# Patient Record
Sex: FEMALE | Race: ASIAN | NOT HISPANIC OR LATINO
[De-identification: names, ages, dates, MRNs, and addresses within clinical notes are randomized per-mention and may not be internally consistent; named-entity substitution may affect disease eponyms.]

---

## 2019-01-01 PROBLEM — Z00.00 ENCOUNTER FOR PREVENTIVE HEALTH EXAMINATION: Status: ACTIVE | Noted: 2019-01-01

## 2019-01-02 PROBLEM — E66.01 MORBID OBESITY: Status: ACTIVE | Noted: 2019-01-02

## 2019-01-09 ENCOUNTER — APPOINTMENT (OUTPATIENT)
Dept: SURGERY | Facility: CLINIC | Age: 29
End: 2019-01-09
Payer: COMMERCIAL

## 2019-01-09 VITALS
WEIGHT: 220 LBS | DIASTOLIC BLOOD PRESSURE: 69 MMHG | BODY MASS INDEX: 40.48 KG/M2 | SYSTOLIC BLOOD PRESSURE: 100 MMHG | HEIGHT: 62 IN | TEMPERATURE: 98.3 F | HEART RATE: 57 BPM

## 2019-01-09 DIAGNOSIS — E66.01 MORBID (SEVERE) OBESITY DUE TO EXCESS CALORIES: ICD-10-CM

## 2019-01-09 DIAGNOSIS — Z83.3 FAMILY HISTORY OF DIABETES MELLITUS: ICD-10-CM

## 2019-01-09 DIAGNOSIS — E28.2 POLYCYSTIC OVARIAN SYNDROME: ICD-10-CM

## 2019-01-09 DIAGNOSIS — Z78.9 OTHER SPECIFIED HEALTH STATUS: ICD-10-CM

## 2019-01-09 DIAGNOSIS — Z87.39 PERSONAL HISTORY OF OTHER DISEASES OF THE MUSCULOSKELETAL SYSTEM AND CONNECTIVE TISSUE: ICD-10-CM

## 2019-01-09 DIAGNOSIS — G43.909 MIGRAINE, UNSPECIFIED, NOT INTRACTABLE, W/OUT STATUS MIGRAINOSUS: ICD-10-CM

## 2019-01-09 DIAGNOSIS — Z82.49 FAMILY HISTORY OF ISCHEMIC HEART DISEASE AND OTHER DISEASES OF THE CIRCULATORY SYSTEM: ICD-10-CM

## 2019-01-09 PROCEDURE — 99203 OFFICE O/P NEW LOW 30 MIN: CPT

## 2019-01-23 ENCOUNTER — APPOINTMENT (OUTPATIENT)
Dept: ULTRASOUND IMAGING | Facility: HOSPITAL | Age: 29
End: 2019-01-23

## 2019-02-20 PROBLEM — Z87.39 HISTORY OF BACK PAIN: Status: RESOLVED | Noted: 2019-01-09 | Resolved: 2019-02-20

## 2019-02-20 PROBLEM — G43.909 MIGRAINE: Status: RESOLVED | Noted: 2019-01-09 | Resolved: 2019-02-20

## 2019-02-20 PROBLEM — Z83.3 FAMILY HISTORY OF DIABETES MELLITUS: Status: ACTIVE | Noted: 2019-02-20

## 2019-02-20 PROBLEM — Z82.49 FAMILY HISTORY OF ESSENTIAL HYPERTENSION: Status: ACTIVE | Noted: 2019-02-20

## 2019-02-20 PROBLEM — Z78.9 SOCIAL ALCOHOL USE: Status: ACTIVE | Noted: 2019-01-09

## 2019-02-20 PROBLEM — E28.2 PCOS (POLYCYSTIC OVARIAN SYNDROME): Status: ACTIVE | Noted: 2019-01-09

## 2019-02-20 RX ORDER — DULOXETINE HYDROCHLORIDE 20 MG/1
20 CAPSULE, DELAYED RELEASE PELLETS ORAL
Refills: 0 | Status: ACTIVE | COMMUNITY

## 2019-09-28 ENCOUNTER — EMERGENCY (EMERGENCY)
Facility: HOSPITAL | Age: 29
LOS: 1 days | Discharge: HOME | End: 2019-09-28
Admitting: EMERGENCY MEDICINE
Payer: COMMERCIAL

## 2019-09-28 VITALS
SYSTOLIC BLOOD PRESSURE: 122 MMHG | RESPIRATION RATE: 18 BRPM | TEMPERATURE: 99 F | OXYGEN SATURATION: 100 % | WEIGHT: 210.1 LBS | DIASTOLIC BLOOD PRESSURE: 75 MMHG | HEIGHT: 62 IN | HEART RATE: 100 BPM

## 2019-09-28 PROCEDURE — 99283 EMERGENCY DEPT VISIT LOW MDM: CPT

## 2019-09-28 RX ORDER — KETOROLAC TROMETHAMINE 30 MG/ML
30 SYRINGE (ML) INJECTION ONCE
Refills: 0 | Status: DISCONTINUED | OUTPATIENT
Start: 2019-09-28 | End: 2019-09-28

## 2019-09-28 RX ORDER — DEXAMETHASONE 0.5 MG/5ML
10 ELIXIR ORAL ONCE
Refills: 0 | Status: COMPLETED | OUTPATIENT
Start: 2019-09-28 | End: 2019-09-28

## 2019-09-28 RX ORDER — DEXAMETHASONE 0.5 MG/5ML
10 ELIXIR ORAL ONCE
Refills: 0 | Status: DISCONTINUED | OUTPATIENT
Start: 2019-09-28 | End: 2019-09-28

## 2019-09-28 RX ORDER — DEXAMETHASONE 0.5 MG/5ML
6 ELIXIR ORAL ONCE
Refills: 0 | Status: DISCONTINUED | OUTPATIENT
Start: 2019-09-28 | End: 2019-09-28

## 2019-09-28 RX ADMIN — Medication 10 MILLIGRAM(S): at 10:55

## 2019-09-28 RX ADMIN — Medication 1 TABLET(S): at 10:49

## 2019-09-28 RX ADMIN — Medication 30 MILLIGRAM(S): at 10:49

## 2019-09-28 NOTE — ED ADULT NURSE NOTE - OBJECTIVE STATEMENT
A&Ox4, respirations even and unlabored, speaks in clear and full sentences, no drooling observed, ambulatory. Patient complains of sore throat, pain when swallowing. Denies chest pain, SOB, LOC.

## 2019-09-28 NOTE — ED PROVIDER NOTE - OBJECTIVE STATEMENT
29 y/o female no pmh c/o sore throat x2 days. Pt admits to pain with swallowing liquids and solids. Admits to mild nausea and chills. Denies taking any OTC meds. Denies chest pain, sob, difficulty swallowing, v/d, dizziness, syncope, or fever.

## 2019-09-28 NOTE — ED PROVIDER NOTE - THROAT FINDINGS
OROPHARYNGEAL EXUDATE/THROAT RED/NO VESICLES/ULCERS/uvula midline/NO TONGUE ELEVATION/NO DROOLING/TONSILLAR SWELLING/NO STRIDOR

## 2019-09-28 NOTE — ED ADULT NURSE NOTE - NSIMPLEMENTINTERV_GEN_ALL_ED
Implemented All Universal Safety Interventions:  [place] to call system. Call bell, personal items and telephone within reach. Instruct patient to call for assistance. Room bathroom lighting operational. Non-slip footwear when patient is off stretcher. Physically safe environment: no spills, clutter or unnecessary equipment. Stretcher in lowest position, wheels locked, appropriate side rails in place.

## 2019-09-28 NOTE — ED PROVIDER NOTE - CLINICAL SUMMARY MEDICAL DECISION MAKING FREE TEXT BOX
29 y/o female no pmh c/o sore throat x2 days- + exudate, + tender anterior adenopathy, no cough. likely strep, will treat with augmentin, toradol and decadron. breonna

## 2019-09-28 NOTE — ED PROVIDER NOTE - PATIENT PORTAL LINK FT
You can access the FollowMyHealth Patient Portal offered by [facility] by registering at the following website: [email or web address]. By joining [employer]’s FollowMyHealth portal, you will also be able to view your health information using other applications (apps) compatible with our system.

## 2020-02-05 ENCOUNTER — RESULT REVIEW (OUTPATIENT)
Age: 30
End: 2020-02-05

## 2020-07-31 ENCOUNTER — EMERGENCY (EMERGENCY)
Facility: HOSPITAL | Age: 30
LOS: 1 days | Discharge: HOME | End: 2020-07-31
Attending: EMERGENCY MEDICINE | Admitting: EMERGENCY MEDICINE
Payer: COMMERCIAL

## 2020-07-31 VITALS
HEART RATE: 78 BPM | DIASTOLIC BLOOD PRESSURE: 48 MMHG | OXYGEN SATURATION: 100 % | SYSTOLIC BLOOD PRESSURE: 112 MMHG | TEMPERATURE: 98 F | RESPIRATION RATE: 17 BRPM

## 2020-07-31 VITALS
OXYGEN SATURATION: 99 % | SYSTOLIC BLOOD PRESSURE: 120 MMHG | HEART RATE: 78 BPM | DIASTOLIC BLOOD PRESSURE: 48 MMHG | RESPIRATION RATE: 15 BRPM | TEMPERATURE: 99 F

## 2020-07-31 PROBLEM — Z78.9 OTHER SPECIFIED HEALTH STATUS: Chronic | Status: ACTIVE | Noted: 2019-09-28

## 2020-07-31 LAB
ALBUMIN SERPL ELPH-MCNC: 4.1 G/DL — SIGNIFICANT CHANGE UP (ref 3.3–5)
ALP SERPL-CCNC: 97 U/L — SIGNIFICANT CHANGE UP (ref 40–120)
ALT FLD-CCNC: 28 U/L — SIGNIFICANT CHANGE UP (ref 4–33)
ANION GAP SERPL CALC-SCNC: 10 MMO/L — SIGNIFICANT CHANGE UP (ref 7–14)
AST SERPL-CCNC: 33 U/L — HIGH (ref 4–32)
BASOPHILS # BLD AUTO: 0.06 K/UL — SIGNIFICANT CHANGE UP (ref 0–0.2)
BASOPHILS NFR BLD AUTO: 0.5 % — SIGNIFICANT CHANGE UP (ref 0–2)
BILIRUB SERPL-MCNC: 0.3 MG/DL — SIGNIFICANT CHANGE UP (ref 0.2–1.2)
BUN SERPL-MCNC: 11 MG/DL — SIGNIFICANT CHANGE UP (ref 7–23)
CALCIUM SERPL-MCNC: 9 MG/DL — SIGNIFICANT CHANGE UP (ref 8.4–10.5)
CHLORIDE SERPL-SCNC: 102 MMOL/L — SIGNIFICANT CHANGE UP (ref 98–107)
CO2 SERPL-SCNC: 24 MMOL/L — SIGNIFICANT CHANGE UP (ref 22–31)
CREAT SERPL-MCNC: 0.63 MG/DL — SIGNIFICANT CHANGE UP (ref 0.5–1.3)
EOSINOPHIL # BLD AUTO: 0.44 K/UL — SIGNIFICANT CHANGE UP (ref 0–0.5)
EOSINOPHIL NFR BLD AUTO: 3.7 % — SIGNIFICANT CHANGE UP (ref 0–6)
GLUCOSE SERPL-MCNC: 123 MG/DL — HIGH (ref 70–99)
HCT VFR BLD CALC: 47.2 % — HIGH (ref 34.5–45)
HGB BLD-MCNC: 15.2 G/DL — SIGNIFICANT CHANGE UP (ref 11.5–15.5)
IMM GRANULOCYTES NFR BLD AUTO: 0.4 % — SIGNIFICANT CHANGE UP (ref 0–1.5)
LYMPHOCYTES # BLD AUTO: 27.4 % — SIGNIFICANT CHANGE UP (ref 13–44)
LYMPHOCYTES # BLD AUTO: 3.28 K/UL — SIGNIFICANT CHANGE UP (ref 1–3.3)
MCHC RBC-ENTMCNC: 25.9 PG — LOW (ref 27–34)
MCHC RBC-ENTMCNC: 32.2 % — SIGNIFICANT CHANGE UP (ref 32–36)
MCV RBC AUTO: 80.5 FL — SIGNIFICANT CHANGE UP (ref 80–100)
MONOCYTES # BLD AUTO: 0.79 K/UL — SIGNIFICANT CHANGE UP (ref 0–0.9)
MONOCYTES NFR BLD AUTO: 6.6 % — SIGNIFICANT CHANGE UP (ref 2–14)
NEUTROPHILS # BLD AUTO: 7.36 K/UL — SIGNIFICANT CHANGE UP (ref 1.8–7.4)
NEUTROPHILS NFR BLD AUTO: 61.4 % — SIGNIFICANT CHANGE UP (ref 43–77)
NRBC # FLD: 0 K/UL — SIGNIFICANT CHANGE UP (ref 0–0)
PLATELET # BLD AUTO: 314 K/UL — SIGNIFICANT CHANGE UP (ref 150–400)
PMV BLD: 9.7 FL — SIGNIFICANT CHANGE UP (ref 7–13)
POTASSIUM SERPL-MCNC: 4 MMOL/L — SIGNIFICANT CHANGE UP (ref 3.5–5.3)
POTASSIUM SERPL-SCNC: 4 MMOL/L — SIGNIFICANT CHANGE UP (ref 3.5–5.3)
PROT SERPL-MCNC: 7.7 G/DL — SIGNIFICANT CHANGE UP (ref 6–8.3)
RBC # BLD: 5.86 M/UL — HIGH (ref 3.8–5.2)
RBC # FLD: 13.5 % — SIGNIFICANT CHANGE UP (ref 10.3–14.5)
SODIUM SERPL-SCNC: 136 MMOL/L — SIGNIFICANT CHANGE UP (ref 135–145)
WBC # BLD: 11.98 K/UL — HIGH (ref 3.8–10.5)
WBC # FLD AUTO: 11.98 K/UL — HIGH (ref 3.8–10.5)

## 2020-07-31 PROCEDURE — 99284 EMERGENCY DEPT VISIT MOD MDM: CPT

## 2020-07-31 RX ORDER — KETOROLAC TROMETHAMINE 30 MG/ML
15 SYRINGE (ML) INJECTION ONCE
Refills: 0 | Status: DISCONTINUED | OUTPATIENT
Start: 2020-07-31 | End: 2020-07-31

## 2020-07-31 RX ORDER — METOCLOPRAMIDE HCL 10 MG
5 TABLET ORAL ONCE
Refills: 0 | Status: DISCONTINUED | OUTPATIENT
Start: 2020-07-31 | End: 2020-07-31

## 2020-07-31 RX ORDER — KETOROLAC TROMETHAMINE 30 MG/ML
30 SYRINGE (ML) INJECTION ONCE
Refills: 0 | Status: DISCONTINUED | OUTPATIENT
Start: 2020-07-31 | End: 2020-07-31

## 2020-07-31 RX ORDER — SODIUM CHLORIDE 9 MG/ML
1000 INJECTION INTRAMUSCULAR; INTRAVENOUS; SUBCUTANEOUS ONCE
Refills: 0 | Status: DISCONTINUED | OUTPATIENT
Start: 2020-07-31 | End: 2020-07-31

## 2020-07-31 RX ORDER — METOCLOPRAMIDE HCL 10 MG
10 TABLET ORAL ONCE
Refills: 0 | Status: COMPLETED | OUTPATIENT
Start: 2020-07-31 | End: 2020-07-31

## 2020-07-31 RX ORDER — SODIUM CHLORIDE 9 MG/ML
1000 INJECTION INTRAMUSCULAR; INTRAVENOUS; SUBCUTANEOUS ONCE
Refills: 0 | Status: COMPLETED | OUTPATIENT
Start: 2020-07-31 | End: 2020-07-31

## 2020-07-31 RX ADMIN — Medication 30 MILLIGRAM(S): at 09:59

## 2020-07-31 RX ADMIN — SODIUM CHLORIDE 1000 MILLILITER(S): 9 INJECTION INTRAMUSCULAR; INTRAVENOUS; SUBCUTANEOUS at 10:00

## 2020-07-31 RX ADMIN — Medication 10 MILLIGRAM(S): at 09:59

## 2020-07-31 NOTE — ED PROVIDER NOTE - ATTENDING CONTRIBUTION TO CARE
Dr. Webster:  I have personally performed a face to face bedside history and physical examination of this patient. I have discussed the history, examination, review of systems, assessment and plan of management with the resident. I have reviewed the electronic medical record and amended it to reflect my history, review of systems, physical exam, assessment and plan.    29F h/o migraines presents with gradual onset right sided headache that started last night.  +Nausea.  Took tylenol prior to ED arrival with little relief.  Denies fever/chills, neck stiffness, cp, vomiting, sob, focal weakness.    Exam:  - nad  - rrr  - ctab  - abd soft ntnd  - no focal neuro deficits    A/P  - R sided headache, likely migraine, no red flags on history/physical  - basic labs, meds, reassess

## 2020-07-31 NOTE — ED PROVIDER NOTE - NS ED ROS FT
Constitutional: No fever, chills.  Eyes:  No visual changes  ENMT:  +headache   Cardiac:  No chest pain  Respiratory:  No cough, SOB  GI:  No nausea, vomiting, diarrhea, abdominal pain.  :  No dysuria, hematuria  MS:  No back pain.  Neuro:  No headache or lightheadedness  Skin:  No skin rash  Endocrine: No history of thyroid disease or diabetes.  Except as documented in the HPI,  all other systems are negative.

## 2020-07-31 NOTE — ED ADULT NURSE NOTE - OBJECTIVE STATEMENT
pt presents wdith c/o HA , nausea, and slight dizziness that started last night, states a history of migraines and that she took tylenol last night that did no help

## 2020-07-31 NOTE — ED PROVIDER NOTE - NSFOLLOWUPINSTRUCTIONS_ED_ALL_ED_FT
You were seen in the emergency department for headache   We checked your blood, electrolyte and all your results came back normal.     - Please continue taking all of your home medications as directed.    - Please follow up with your PMD in the next 24-48hrs.  - Please return to the ED if you have any new or concerning symptoms.  - If you have any severe increase in fever, pain, uncontrollable nausea, vomiting, or inability to tolerate eating and drinking you need to come right back to the emergency room.     - Follow up with neurology as directed for further management.

## 2020-07-31 NOTE — ED PROVIDER NOTE - PHYSICAL EXAMINATION
Vital signs reviewed  GENERAL: Patient nontoxic appearing, NAD  HEAD: NCAT  EYES: Anicteric  ENT: MMM  NECK: Supple, non tender  RESPIRATORY: Normal respiratory effort. CTA B/L. No wheezing, rales, rhonchi  CARDIOVASCULAR: Regular rate and rhythm  ABDOMEN: Soft. Nondistended. Nontender. No guarding or rebound. No CVA tenderness.  MUSCULOSKELETAL/EXTREMITIES: Brisk cap refill. 2+ radial pulses. No leg edema.  SKIN:  Warm and dry  NEURO: AAOx3. No gross FND. CN 2-12 intact. MS 5/5 B/L. sensation intact. no temple tenderness. full ROM of the neck.   PSYCHIATRIC: Cooperative. Affect appropriate.

## 2020-07-31 NOTE — ED PROVIDER NOTE - NSFOLLOWUPCLINICS_GEN_ALL_ED_FT
[facility]  Neurology  [address] [place] [address] 3rd [place] [address]  Phone: [phone]  Fax:   Follow Up Time:     [facility]  Neurology  [address] [place] [address]  Phone: [phone]  Fax: [phone]  Follow Up Time:

## 2020-07-31 NOTE — ED PROVIDER NOTE - OBJECTIVE STATEMENT
28 y/o F p/w right sided headache and nausea x 12 hrs. pt states the pain gradually worsened to a 7/10 today. pain comes and goes and does not radiate. she has a hx of migraines which are usually localized to the back of the neck. Pt took 500mg of tylenol before arrival to the ED with no help. denies fever, chills, cp, sob, vomiting, diarrhea, numbness/tingling/weakness, blurry vision, worst headache of life.

## 2020-07-31 NOTE — ED ADULT TRIAGE NOTE - CHIEF COMPLAINT QUOTE
alert oriented no distress c/o right side headache with dizziness and blurry vision started last night  took tylenol with no relief  no c/o weakness  has hx migraines ( pain usually in back of head)

## 2020-07-31 NOTE — ED PROVIDER NOTE - CLINICAL SUMMARY MEDICAL DECISION MAKING FREE TEXT BOX
28 y/o F pmhx migraines p/w right sided headache x 12hrs. vitals WNL, physical exam neurologically intact without any gross abnormalities. differentials include migraine vs tension headache vs muscle spasm. will evaluate with pain meds and 1L fluids.

## 2020-07-31 NOTE — ED PROVIDER NOTE - PATIENT PORTAL LINK FT
You can access the FollowMyHealth Patient Portal offered by [facility] by registering at the following website: [email or web address]. By joining [employer]’s FollowMyHealth portal, you will also be able to view your health information using other applications (apps) compatible with our system.

## 2021-06-22 ENCOUNTER — RESULT REVIEW (OUTPATIENT)
Age: 31
End: 2021-06-22

## 2021-11-27 ENCOUNTER — EMERGENCY (EMERGENCY)
Facility: HOSPITAL | Age: 31
LOS: 1 days | Discharge: HOME | End: 2021-11-27
Attending: STUDENT IN AN ORGANIZED HEALTH CARE EDUCATION/TRAINING PROGRAM | Admitting: STUDENT IN AN ORGANIZED HEALTH CARE EDUCATION/TRAINING PROGRAM
Payer: COMMERCIAL

## 2021-11-27 VITALS
TEMPERATURE: 98 F | OXYGEN SATURATION: 100 % | HEART RATE: 69 BPM | SYSTOLIC BLOOD PRESSURE: 133 MMHG | DIASTOLIC BLOOD PRESSURE: 72 MMHG | HEIGHT: 62 IN | RESPIRATION RATE: 15 BRPM

## 2021-11-27 PROCEDURE — 99284 EMERGENCY DEPT VISIT MOD MDM: CPT

## 2021-11-27 NOTE — ED ADULT TRIAGE NOTE - BRAND OF COVID-19 VACCINATION
Strep Throat: Care Instructions  Your Care Instructions    Strep throat is a bacterial infection that causes sudden, severe sore throat and fever. Strep throat, which is caused by bacteria called streptococcus, is treated with antibiotics. Sometimes a strep test is necessary to tell if the sore throat is caused by strep bacteria. Treatment can help ease symptoms and may prevent future problems. Follow-up care is a key part of your treatment and safety. Be sure to make and go to all appointments, and call your doctor if you are having problems. It's also a good idea to know your test results and keep a list of the medicines you take. How can you care for yourself at home? · Take your antibiotics as directed. Do not stop taking them just because you feel better. You need to take the full course of antibiotics. · Strep throat can spread to others until 24 hours after you begin taking antibiotics. During this time, you should avoid contact with other people at work or home, especially infants and children. Do not sneeze or cough on others, and wash your hands often. Keep your drinking glass and eating utensils separate from those of others, and wash these items well in hot, soapy water. · Gargle with warm salt water at least once each hour to help reduce swelling and make your throat feel better. Use 1 teaspoon of salt mixed in 8 fluid ounces of warm water. · Take an over-the-counter pain medication, such as acetaminophen (Tylenol), ibuprofen (Advil, Motrin), or naproxen (Aleve). Read and follow all instructions on the label. · Try an over-the-counter anesthetic throat spray or throat lozenges, which may help relieve throat pain. · Drink plenty of fluids. Fluids may help soothe an irritated throat. Hot fluids, such as tea or soup, may help your throat feel better. · Eat soft solids and drink plenty of clear liquids.  Flavored ice pops, ice cream, scrambled eggs, sherbet, and gelatin dessert (such as Jell-O) may also soothe the throat. · Get lots of rest.  · Do not smoke, and avoid secondhand smoke. If you need help quitting, talk to your doctor about stop-smoking programs and medicines. These can increase your chances of quitting for good. · Use a vaporizer or humidifier to add moisture to the air in your bedroom. Follow the directions for cleaning the machine. When should you call for help? Call your doctor now or seek immediate medical care if:  ? · You have a new or higher fever. ? · You have a fever with a stiff neck or severe headache. ? · You have new or worse trouble swallowing. ? · Your sore throat gets much worse on one side. ? · Your pain becomes much worse on one side of your throat. ? Watch closely for changes in your health, and be sure to contact your doctor if:  ? · You are not getting better after 2 days (48 hours). ? · You do not get better as expected. Where can you learn more? Go to http://enedelia-crow.info/. Enter K625 in the search box to learn more about \"Strep Throat: Care Instructions. \"  Current as of: May 12, 2017  Content Version: 11.4  © 7088-1698 Healthwise[employer]. Care instructions adapted under license by [employer] (which disclaims liability or warranty for this information). If you have questions about a medical condition or this instruction, always ask your healthcare professional. [address] any warranty or liability for your use of this information. Moderna dose 1 and 2

## 2021-11-27 NOTE — ED ADULT TRIAGE NOTE - CHIEF COMPLAINT QUOTE
Pt arrives c/o heavy vaginal bleeding x3 days saturating 1 pad per hour. Pt endorsing lightheadedness and generalized fatigue. pt denies PMHx.

## 2021-11-28 VITALS
SYSTOLIC BLOOD PRESSURE: 112 MMHG | HEART RATE: 82 BPM | RESPIRATION RATE: 18 BRPM | DIASTOLIC BLOOD PRESSURE: 64 MMHG | TEMPERATURE: 98 F | OXYGEN SATURATION: 100 %

## 2021-11-28 LAB
ALBUMIN SERPL ELPH-MCNC: 3.4 G/DL — SIGNIFICANT CHANGE UP (ref 3.3–5)
ALP SERPL-CCNC: 103 U/L — SIGNIFICANT CHANGE UP (ref 40–120)
ALT FLD-CCNC: 25 U/L — SIGNIFICANT CHANGE UP (ref 4–33)
ANION GAP SERPL CALC-SCNC: 9 MMOL/L — SIGNIFICANT CHANGE UP (ref 7–14)
AST SERPL-CCNC: 39 U/L — HIGH (ref 4–32)
BASOPHILS # BLD AUTO: 0.05 K/UL — SIGNIFICANT CHANGE UP (ref 0–0.2)
BASOPHILS NFR BLD AUTO: 0.5 % — SIGNIFICANT CHANGE UP (ref 0–2)
BILIRUB SERPL-MCNC: 0.3 MG/DL — SIGNIFICANT CHANGE UP (ref 0.2–1.2)
BLD GP AB SCN SERPL QL: NEGATIVE — SIGNIFICANT CHANGE UP
BUN SERPL-MCNC: 11 MG/DL — SIGNIFICANT CHANGE UP (ref 7–23)
CALCIUM SERPL-MCNC: 8.4 MG/DL — SIGNIFICANT CHANGE UP (ref 8.4–10.5)
CHLORIDE SERPL-SCNC: 103 MMOL/L — SIGNIFICANT CHANGE UP (ref 98–107)
CO2 SERPL-SCNC: 24 MMOL/L — SIGNIFICANT CHANGE UP (ref 22–31)
CREAT SERPL-MCNC: 0.75 MG/DL — SIGNIFICANT CHANGE UP (ref 0.5–1.3)
EOSINOPHIL # BLD AUTO: 0.49 K/UL — SIGNIFICANT CHANGE UP (ref 0–0.5)
EOSINOPHIL NFR BLD AUTO: 4.6 % — SIGNIFICANT CHANGE UP (ref 0–6)
GLUCOSE SERPL-MCNC: 185 MG/DL — HIGH (ref 70–99)
HCG SERPL-ACNC: <5 MIU/ML — SIGNIFICANT CHANGE UP
HCT VFR BLD CALC: 42.3 % — SIGNIFICANT CHANGE UP (ref 34.5–45)
HGB BLD-MCNC: 13.3 G/DL — SIGNIFICANT CHANGE UP (ref 11.5–15.5)
IANC: 6.4 K/UL — SIGNIFICANT CHANGE UP (ref 1.5–8.5)
IMM GRANULOCYTES NFR BLD AUTO: 0.4 % — SIGNIFICANT CHANGE UP (ref 0–1.5)
LYMPHOCYTES # BLD AUTO: 28.9 % — SIGNIFICANT CHANGE UP (ref 13–44)
LYMPHOCYTES # BLD AUTO: 3.06 K/UL — SIGNIFICANT CHANGE UP (ref 1–3.3)
MCHC RBC-ENTMCNC: 25.6 PG — LOW (ref 27–34)
MCHC RBC-ENTMCNC: 31.4 GM/DL — LOW (ref 32–36)
MCV RBC AUTO: 81.3 FL — SIGNIFICANT CHANGE UP (ref 80–100)
MONOCYTES # BLD AUTO: 0.54 K/UL — SIGNIFICANT CHANGE UP (ref 0–0.9)
MONOCYTES NFR BLD AUTO: 5.1 % — SIGNIFICANT CHANGE UP (ref 2–14)
NEUTROPHILS # BLD AUTO: 6.4 K/UL — SIGNIFICANT CHANGE UP (ref 1.8–7.4)
NEUTROPHILS NFR BLD AUTO: 60.5 % — SIGNIFICANT CHANGE UP (ref 43–77)
NRBC # BLD: 0 /100 WBCS — SIGNIFICANT CHANGE UP
NRBC # FLD: 0 K/UL — SIGNIFICANT CHANGE UP
PLATELET # BLD AUTO: 276 K/UL — SIGNIFICANT CHANGE UP (ref 150–400)
POTASSIUM SERPL-MCNC: 3.6 MMOL/L — SIGNIFICANT CHANGE UP (ref 3.5–5.3)
POTASSIUM SERPL-SCNC: 3.6 MMOL/L — SIGNIFICANT CHANGE UP (ref 3.5–5.3)
PROT SERPL-MCNC: 6.7 G/DL — SIGNIFICANT CHANGE UP (ref 6–8.3)
RBC # BLD: 5.2 M/UL — SIGNIFICANT CHANGE UP (ref 3.8–5.2)
RBC # FLD: 13.8 % — SIGNIFICANT CHANGE UP (ref 10.3–14.5)
RH IG SCN BLD-IMP: POSITIVE — SIGNIFICANT CHANGE UP
SODIUM SERPL-SCNC: 136 MMOL/L — SIGNIFICANT CHANGE UP (ref 135–145)
WBC # BLD: 10.58 K/UL — HIGH (ref 3.8–10.5)
WBC # FLD AUTO: 10.58 K/UL — HIGH (ref 3.8–10.5)

## 2021-11-28 RX ORDER — IBUPROFEN 200 MG
400 TABLET ORAL ONCE
Refills: 0 | Status: COMPLETED | OUTPATIENT
Start: 2021-11-28 | End: 2021-11-28

## 2021-11-28 RX ADMIN — Medication 400 MILLIGRAM(S): at 02:11

## 2021-11-28 NOTE — ED PROVIDER NOTE - PHYSICAL EXAMINATION
Attending/MD Jayy.   VITALS: reviewed  GEN: No apparent distress, A & O x 4  HEAD/EYES: NC/AT, PERRL, EOMI, anicteric sclerae, no conjunctival pallor  ENT: mucus membranes moist, trachea midline, no JVD, neck is supple  RESP: lungs CTA with equal breath sounds bilaterally, chest wall nontender and atraumatic  CV: heart with reg rhythm S1, S2, no murmur; distal pulses intact and symmetric bilaterally  ABDOMEN: normoactive bowel sounds, soft, nondistended, nontender  MSK: extremities atraumatic and nontender, no edema, no asymmetry.   SKIN: warm, dry, no rash, no bruising, no cyanosis.  NEURO: alert, mentating appropriately, no facial asymmetry.  PSYCH: Affect appropriate

## 2021-11-28 NOTE — ED PROVIDER NOTE - ATTENDING CONTRIBUTION TO CARE
I have personally seen and examined this patient.  I have fully participated in the care of this patient. I have reviewed all pertinent clinical information, including history, physical exam, plan and the Resident’s note and agree except as noted. - MD Jayy.    Attending, Jayy HELMS:  I have independently evaluated the patient and have documented in the appropriate sections above.  I agree with the exam and plan as noted above.

## 2021-11-28 NOTE — ED PROVIDER NOTE - NSFOLLOWUPINSTRUCTIONS_ED_ALL_ED_FT
Thank you for visiting our Emergency Department, it has been a pleasure taking part in your healthcare.    You had a thorough evaluation including an exam, labs and imaging. You were given medications for comfort. Your workup did not demonstrate any concerning findings. This does not mean that your symptoms are not real, only that we were unable to find a dangerous or life-threatening cause. Please read the attached information sheets as they will provide useful information regarding your condition.    Your discharge diagnosis is: heavy menstrual period  Return precautions to the Emergency Department include but are not limited to: unrelenting nausea, vomiting, fever, chills, chest pain, shortness of breath, dizziness, chest or abdominal pain, worsening back pain, syncope, blood in urine or stool, headache that doesn't resolve, numbness or tingling, loss of sensation, loss of motor function, or any other concerning symptoms.    1) Follow up with your primary care doctor within 48 hours. Please call [phone] to make an appointment or with any questions you may have.  or call [phone] to make an appointment with the clinic  2) Take Tylenol 650-1000mg every six hours and supplement with ibuprofen 400mg, with food, every six hours which can be taken three hours apart from the Tylenol to have a layered effect. Please do not take these medications if you do no have pain or if you have any history of bleeding disorders, kidney or liver disease. Do not use ibuprofen if you are on blood thinners (anti-coagulation).  3) Drink at least 2 Liters or 64 Ounces of water each day.

## 2021-11-28 NOTE — ED PROVIDER NOTE - OBJECTIVE STATEMENT
Attending/MD Jayy. 30 yo F with PCOS, removal of IUD 3 mo ago, p/w heavy vaginal bleed, x3d, since then had twice menstrual period, heavier than normal, + irregular periods, not taking OCP yet. Denies sob, chest pain, dizziness. Pt has an appointment with GYN next week.

## 2021-11-28 NOTE — ED PROVIDER NOTE - PATIENT PORTAL LINK FT
You can access the FollowMyHealth Patient Portal offered by [facility] by registering at the following website: [email or web address]. By joining [employer]’s FollowMyHealth portal, you will also be able to view your health information using other applications (apps) compatible with our system.

## 2021-11-28 NOTE — ED ADULT NURSE NOTE - OBJECTIVE STATEMENT
Facilitator RN - Pt. received in room 12, A&Ox4, ambulatory. Denies PMHx. C/o vaginal bleeding for 1 week, states for the past 3 days increased vaginal bleeding with clots. Endorses changing her tampon every 1.5 hours. Reports she had Mirena removed in mid September and has had intermittent vaginal spotting since then, but the past 3 days symptoms became more severe. Endorses intermittent lightheadedness and fatigue. Denies dizziness, weakness, n/v/d, abd pain, fever, chills, chest pain, SOB. Respirations even & unlabored on room air. Report given to primary RN Abby. MD evaluation in progress.

## 2021-11-28 NOTE — ED ADULT NURSE NOTE - INTERVENTIONS DEFINITIONS
[place] to call system/Call bell, personal items and telephone within reach/Non-slip footwear when patient is off stretcher/Stretcher in lowest position, wheels locked, appropriate side rails in place

## 2021-11-28 NOTE — ED PROVIDER NOTE - CLINICAL SUMMARY MEDICAL DECISION MAKING FREE TEXT BOX
Attending/MD Jayy. 32 yo F, with PCOS, p/w heavier vaginal bleed, no cramping abd pain, will get HCG, if negative pregnant, likely dysmenorhea, check anemia, if no lowe Hb, pt can be followed up with GYN, not consider OCP at this time, NSAIDs for dysmennrhea.

## 2022-12-27 NOTE — ED PROVIDER NOTE - NS_EDPROVIDERDISPOUSERTYPE_ED_A_ED
[place] from [facility] called to follow up on a request for office notes to support the diagnosis of lymphedema, they are trying to help the patient with coverage for a compression pump, please call to [phone] ext [phone], thank you   Attending Attestation (For Attendings USE Only)...

## 2023-04-05 ENCOUNTER — TRANSCRIPTION ENCOUNTER (OUTPATIENT)
Age: 33
End: 2023-04-05

## 2023-04-06 ENCOUNTER — INPATIENT (INPATIENT)
Facility: HOSPITAL | Age: 33
LOS: 0 days | Discharge: HOME | End: 2023-04-07
Attending: SURGERY | Admitting: SURGERY
Payer: COMMERCIAL

## 2023-04-06 ENCOUNTER — TRANSCRIPTION ENCOUNTER (OUTPATIENT)
Age: 33
End: 2023-04-06

## 2023-04-06 ENCOUNTER — RESULT REVIEW (OUTPATIENT)
Age: 33
End: 2023-04-06

## 2023-04-06 VITALS
SYSTOLIC BLOOD PRESSURE: 111 MMHG | TEMPERATURE: 99 F | HEART RATE: 85 BPM | RESPIRATION RATE: 18 BRPM | DIASTOLIC BLOOD PRESSURE: 72 MMHG | OXYGEN SATURATION: 100 %

## 2023-04-06 DIAGNOSIS — K81.0 ACUTE CHOLECYSTITIS: ICD-10-CM

## 2023-04-06 DIAGNOSIS — Z90.3 ACQUIRED ABSENCE OF STOMACH [PART OF]: Chronic | ICD-10-CM

## 2023-04-06 LAB
ALBUMIN SERPL ELPH-MCNC: 3.3 G/DL — SIGNIFICANT CHANGE UP (ref 3.3–5)
ALBUMIN SERPL ELPH-MCNC: 4 G/DL — SIGNIFICANT CHANGE UP (ref 3.3–5)
ALP SERPL-CCNC: 74 U/L — SIGNIFICANT CHANGE UP (ref 40–120)
ALP SERPL-CCNC: 86 U/L — SIGNIFICANT CHANGE UP (ref 40–120)
ALT FLD-CCNC: 6 U/L — SIGNIFICANT CHANGE UP (ref 4–33)
ALT FLD-CCNC: 6 U/L — SIGNIFICANT CHANGE UP (ref 4–33)
ANION GAP SERPL CALC-SCNC: 11 MMOL/L — SIGNIFICANT CHANGE UP (ref 7–14)
ANION GAP SERPL CALC-SCNC: 12 MMOL/L — SIGNIFICANT CHANGE UP (ref 7–14)
APPEARANCE UR: CLEAR — SIGNIFICANT CHANGE UP
AST SERPL-CCNC: 17 U/L — SIGNIFICANT CHANGE UP (ref 4–32)
AST SERPL-CCNC: 18 U/L — SIGNIFICANT CHANGE UP (ref 4–32)
B PERT DNA SPEC QL NAA+PROBE: SIGNIFICANT CHANGE UP
B PERT+PARAPERT DNA PNL SPEC NAA+PROBE: SIGNIFICANT CHANGE UP
BASOPHILS # BLD AUTO: 0.03 K/UL — SIGNIFICANT CHANGE UP (ref 0–0.2)
BASOPHILS NFR BLD AUTO: 0.2 % — SIGNIFICANT CHANGE UP (ref 0–2)
BILIRUB SERPL-MCNC: 0.6 MG/DL — SIGNIFICANT CHANGE UP (ref 0.2–1.2)
BILIRUB SERPL-MCNC: 0.7 MG/DL — SIGNIFICANT CHANGE UP (ref 0.2–1.2)
BILIRUB UR-MCNC: NEGATIVE — SIGNIFICANT CHANGE UP
BLD GP AB SCN SERPL QL: NEGATIVE — SIGNIFICANT CHANGE UP
BORDETELLA PARAPERTUSSIS (RAPRVP): SIGNIFICANT CHANGE UP
BUN SERPL-MCNC: 4 MG/DL — LOW (ref 7–23)
BUN SERPL-MCNC: 5 MG/DL — LOW (ref 7–23)
C PNEUM DNA SPEC QL NAA+PROBE: SIGNIFICANT CHANGE UP
CALCIUM SERPL-MCNC: 7.8 MG/DL — LOW (ref 8.4–10.5)
CALCIUM SERPL-MCNC: 9 MG/DL — SIGNIFICANT CHANGE UP (ref 8.4–10.5)
CHLORIDE SERPL-SCNC: 102 MMOL/L — SIGNIFICANT CHANGE UP (ref 98–107)
CHLORIDE SERPL-SCNC: 104 MMOL/L — SIGNIFICANT CHANGE UP (ref 98–107)
CO2 SERPL-SCNC: 19 MMOL/L — LOW (ref 22–31)
CO2 SERPL-SCNC: 23 MMOL/L — SIGNIFICANT CHANGE UP (ref 22–31)
COLOR SPEC: SIGNIFICANT CHANGE UP
CREAT SERPL-MCNC: 0.5 MG/DL — SIGNIFICANT CHANGE UP (ref 0.5–1.3)
CREAT SERPL-MCNC: 0.55 MG/DL — SIGNIFICANT CHANGE UP (ref 0.5–1.3)
DIFF PNL FLD: NEGATIVE — SIGNIFICANT CHANGE UP
EGFR: 126 ML/MIN/1.73M2 — SIGNIFICANT CHANGE UP
EGFR: 129 ML/MIN/1.73M2 — SIGNIFICANT CHANGE UP
EOSINOPHIL # BLD AUTO: 0.04 K/UL — SIGNIFICANT CHANGE UP (ref 0–0.5)
EOSINOPHIL NFR BLD AUTO: 0.3 % — SIGNIFICANT CHANGE UP (ref 0–6)
FLUAV SUBTYP SPEC NAA+PROBE: SIGNIFICANT CHANGE UP
FLUBV RNA SPEC QL NAA+PROBE: SIGNIFICANT CHANGE UP
GLUCOSE SERPL-MCNC: 123 MG/DL — HIGH (ref 70–99)
GLUCOSE SERPL-MCNC: 87 MG/DL — SIGNIFICANT CHANGE UP (ref 70–99)
GLUCOSE UR QL: NEGATIVE — SIGNIFICANT CHANGE UP
HADV DNA SPEC QL NAA+PROBE: SIGNIFICANT CHANGE UP
HCG SERPL-ACNC: <5 MIU/ML — SIGNIFICANT CHANGE UP
HCOV 229E RNA SPEC QL NAA+PROBE: SIGNIFICANT CHANGE UP
HCOV HKU1 RNA SPEC QL NAA+PROBE: SIGNIFICANT CHANGE UP
HCOV NL63 RNA SPEC QL NAA+PROBE: SIGNIFICANT CHANGE UP
HCOV OC43 RNA SPEC QL NAA+PROBE: SIGNIFICANT CHANGE UP
HCT VFR BLD CALC: 35.5 % — SIGNIFICANT CHANGE UP (ref 34.5–45)
HCT VFR BLD CALC: 35.8 % — SIGNIFICANT CHANGE UP (ref 34.5–45)
HGB BLD-MCNC: 10.7 G/DL — LOW (ref 11.5–15.5)
HGB BLD-MCNC: 10.9 G/DL — LOW (ref 11.5–15.5)
HMPV RNA SPEC QL NAA+PROBE: SIGNIFICANT CHANGE UP
HPIV1 RNA SPEC QL NAA+PROBE: SIGNIFICANT CHANGE UP
HPIV2 RNA SPEC QL NAA+PROBE: SIGNIFICANT CHANGE UP
HPIV3 RNA SPEC QL NAA+PROBE: SIGNIFICANT CHANGE UP
HPIV4 RNA SPEC QL NAA+PROBE: SIGNIFICANT CHANGE UP
IANC: 13.45 K/UL — HIGH (ref 1.8–7.4)
IMM GRANULOCYTES NFR BLD AUTO: 0.4 % — SIGNIFICANT CHANGE UP (ref 0–0.9)
KETONES UR-MCNC: ABNORMAL
LEUKOCYTE ESTERASE UR-ACNC: NEGATIVE — SIGNIFICANT CHANGE UP
LIDOCAIN IGE QN: 24 U/L — SIGNIFICANT CHANGE UP (ref 7–60)
LYMPHOCYTES # BLD AUTO: 1.05 K/UL — SIGNIFICANT CHANGE UP (ref 1–3.3)
LYMPHOCYTES # BLD AUTO: 6.7 % — LOW (ref 13–44)
M PNEUMO DNA SPEC QL NAA+PROBE: SIGNIFICANT CHANGE UP
MAGNESIUM SERPL-MCNC: 1.8 MG/DL — SIGNIFICANT CHANGE UP (ref 1.6–2.6)
MAGNESIUM SERPL-MCNC: 1.9 MG/DL — SIGNIFICANT CHANGE UP (ref 1.6–2.6)
MCHC RBC-ENTMCNC: 21.7 PG — LOW (ref 27–34)
MCHC RBC-ENTMCNC: 22.1 PG — LOW (ref 27–34)
MCHC RBC-ENTMCNC: 30.1 GM/DL — LOW (ref 32–36)
MCHC RBC-ENTMCNC: 30.4 GM/DL — LOW (ref 32–36)
MCV RBC AUTO: 71.2 FL — LOW (ref 80–100)
MCV RBC AUTO: 73.2 FL — LOW (ref 80–100)
MONOCYTES # BLD AUTO: 1.01 K/UL — HIGH (ref 0–0.9)
MONOCYTES NFR BLD AUTO: 6.5 % — SIGNIFICANT CHANGE UP (ref 2–14)
NEUTROPHILS # BLD AUTO: 13.45 K/UL — HIGH (ref 1.8–7.4)
NEUTROPHILS NFR BLD AUTO: 85.9 % — HIGH (ref 43–77)
NITRITE UR-MCNC: NEGATIVE — SIGNIFICANT CHANGE UP
NRBC # BLD: 0 /100 WBCS — SIGNIFICANT CHANGE UP (ref 0–0)
NRBC # BLD: 0 /100 WBCS — SIGNIFICANT CHANGE UP (ref 0–0)
NRBC # FLD: 0 K/UL — SIGNIFICANT CHANGE UP (ref 0–0)
NRBC # FLD: 0 K/UL — SIGNIFICANT CHANGE UP (ref 0–0)
PH UR: 6.5 — SIGNIFICANT CHANGE UP (ref 5–8)
PHOSPHATE SERPL-MCNC: 2.6 MG/DL — SIGNIFICANT CHANGE UP (ref 2.5–4.5)
PHOSPHATE SERPL-MCNC: 2.7 MG/DL — SIGNIFICANT CHANGE UP (ref 2.5–4.5)
PLATELET # BLD AUTO: 272 K/UL — SIGNIFICANT CHANGE UP (ref 150–400)
PLATELET # BLD AUTO: 296 K/UL — SIGNIFICANT CHANGE UP (ref 150–400)
POTASSIUM SERPL-MCNC: 3.3 MMOL/L — LOW (ref 3.5–5.3)
POTASSIUM SERPL-MCNC: 4 MMOL/L — SIGNIFICANT CHANGE UP (ref 3.5–5.3)
POTASSIUM SERPL-SCNC: 3.3 MMOL/L — LOW (ref 3.5–5.3)
POTASSIUM SERPL-SCNC: 4 MMOL/L — SIGNIFICANT CHANGE UP (ref 3.5–5.3)
PROT SERPL-MCNC: 6 G/DL — SIGNIFICANT CHANGE UP (ref 6–8.3)
PROT SERPL-MCNC: 6.9 G/DL — SIGNIFICANT CHANGE UP (ref 6–8.3)
PROT UR-MCNC: ABNORMAL
RAPID RVP RESULT: SIGNIFICANT CHANGE UP
RBC # BLD: 4.85 M/UL — SIGNIFICANT CHANGE UP (ref 3.8–5.2)
RBC # BLD: 5.03 M/UL — SIGNIFICANT CHANGE UP (ref 3.8–5.2)
RBC # FLD: 16.2 % — HIGH (ref 10.3–14.5)
RBC # FLD: 16.3 % — HIGH (ref 10.3–14.5)
RBC CASTS # UR COMP ASSIST: SIGNIFICANT CHANGE UP /HPF (ref 0–4)
RH IG SCN BLD-IMP: POSITIVE — SIGNIFICANT CHANGE UP
RSV RNA SPEC QL NAA+PROBE: SIGNIFICANT CHANGE UP
RV+EV RNA SPEC QL NAA+PROBE: SIGNIFICANT CHANGE UP
SARS-COV-2 RNA SPEC QL NAA+PROBE: SIGNIFICANT CHANGE UP
SODIUM SERPL-SCNC: 134 MMOL/L — LOW (ref 135–145)
SODIUM SERPL-SCNC: 137 MMOL/L — SIGNIFICANT CHANGE UP (ref 135–145)
SP GR SPEC: 1.04 — SIGNIFICANT CHANGE UP (ref 1.01–1.05)
TROPONIN T, HIGH SENSITIVITY RESULT: <6 NG/L — SIGNIFICANT CHANGE UP
UROBILINOGEN FLD QL: SIGNIFICANT CHANGE UP
WBC # BLD: 15.64 K/UL — HIGH (ref 3.8–10.5)
WBC # BLD: 15.67 K/UL — HIGH (ref 3.8–10.5)
WBC # FLD AUTO: 15.64 K/UL — HIGH (ref 3.8–10.5)
WBC # FLD AUTO: 15.67 K/UL — HIGH (ref 3.8–10.5)
WBC UR QL: SIGNIFICANT CHANGE UP /HPF (ref 0–5)

## 2023-04-06 PROCEDURE — 47562 LAPAROSCOPIC CHOLECYSTECTOMY: CPT | Mod: GC

## 2023-04-06 PROCEDURE — 71045 X-RAY EXAM CHEST 1 VIEW: CPT | Mod: 26

## 2023-04-06 PROCEDURE — 74177 CT ABD & PELVIS W/CONTRAST: CPT | Mod: 26,MA

## 2023-04-06 PROCEDURE — 88304 TISSUE EXAM BY PATHOLOGIST: CPT | Mod: 26

## 2023-04-06 PROCEDURE — 99285 EMERGENCY DEPT VISIT HI MDM: CPT

## 2023-04-06 PROCEDURE — 99221 1ST HOSP IP/OBS SF/LOW 40: CPT | Mod: 25,57,GC

## 2023-04-06 DEVICE — CLIP APPLIER COVIDIEN ENDOCLIP II 10MM MED/LG: Type: IMPLANTABLE DEVICE | Status: FUNCTIONAL

## 2023-04-06 RX ORDER — POTASSIUM CHLORIDE 20 MEQ
10 PACKET (EA) ORAL
Refills: 0 | Status: DISCONTINUED | OUTPATIENT
Start: 2023-04-06 | End: 2023-04-06

## 2023-04-06 RX ORDER — SODIUM CHLORIDE 9 MG/ML
1000 INJECTION INTRAMUSCULAR; INTRAVENOUS; SUBCUTANEOUS ONCE
Refills: 0 | Status: COMPLETED | OUTPATIENT
Start: 2023-04-06 | End: 2023-04-06

## 2023-04-06 RX ORDER — PIPERACILLIN AND TAZOBACTAM 4; .5 G/20ML; G/20ML
3.38 INJECTION, POWDER, LYOPHILIZED, FOR SOLUTION INTRAVENOUS ONCE
Refills: 0 | Status: COMPLETED | OUTPATIENT
Start: 2023-04-06 | End: 2023-04-06

## 2023-04-06 RX ORDER — OXYCODONE HYDROCHLORIDE 5 MG/1
5 TABLET ORAL EVERY 4 HOURS
Refills: 0 | Status: DISCONTINUED | OUTPATIENT
Start: 2023-04-06 | End: 2023-04-07

## 2023-04-06 RX ORDER — FENTANYL CITRATE 50 UG/ML
50 INJECTION INTRAVENOUS
Refills: 0 | Status: DISCONTINUED | OUTPATIENT
Start: 2023-04-06 | End: 2023-04-07

## 2023-04-06 RX ORDER — ACETAMINOPHEN 500 MG
975 TABLET ORAL EVERY 6 HOURS
Refills: 0 | Status: DISCONTINUED | OUTPATIENT
Start: 2023-04-06 | End: 2023-04-07

## 2023-04-06 RX ORDER — SODIUM CHLORIDE 9 MG/ML
1000 INJECTION, SOLUTION INTRAVENOUS ONCE
Refills: 0 | Status: COMPLETED | OUTPATIENT
Start: 2023-04-06 | End: 2023-04-06

## 2023-04-06 RX ORDER — SODIUM CHLORIDE 9 MG/ML
1000 INJECTION, SOLUTION INTRAVENOUS
Refills: 0 | Status: DISCONTINUED | OUTPATIENT
Start: 2023-04-06 | End: 2023-04-07

## 2023-04-06 RX ORDER — ONDANSETRON 8 MG/1
4 TABLET, FILM COATED ORAL ONCE
Refills: 0 | Status: COMPLETED | OUTPATIENT
Start: 2023-04-06 | End: 2023-04-06

## 2023-04-06 RX ORDER — IOHEXOL 300 MG/ML
30 INJECTION, SOLUTION INTRAVENOUS ONCE
Refills: 0 | Status: COMPLETED | OUTPATIENT
Start: 2023-04-06 | End: 2023-04-06

## 2023-04-06 RX ORDER — POTASSIUM CHLORIDE 20 MEQ
40 PACKET (EA) ORAL ONCE
Refills: 0 | Status: COMPLETED | OUTPATIENT
Start: 2023-04-06 | End: 2023-04-06

## 2023-04-06 RX ORDER — THIAMINE MONONITRATE (VIT B1) 100 MG
100 TABLET ORAL ONCE
Refills: 0 | Status: COMPLETED | OUTPATIENT
Start: 2023-04-06 | End: 2023-04-06

## 2023-04-06 RX ORDER — ACETAMINOPHEN 500 MG
1000 TABLET ORAL ONCE
Refills: 0 | Status: COMPLETED | OUTPATIENT
Start: 2023-04-06 | End: 2023-04-06

## 2023-04-06 RX ORDER — ONDANSETRON 8 MG/1
4 TABLET, FILM COATED ORAL ONCE
Refills: 0 | Status: DISCONTINUED | OUTPATIENT
Start: 2023-04-06 | End: 2023-04-07

## 2023-04-06 RX ORDER — MORPHINE SULFATE 50 MG/1
4 CAPSULE, EXTENDED RELEASE ORAL ONCE
Refills: 0 | Status: DISCONTINUED | OUTPATIENT
Start: 2023-04-06 | End: 2023-04-06

## 2023-04-06 RX ORDER — PIPERACILLIN AND TAZOBACTAM 4; .5 G/20ML; G/20ML
3.38 INJECTION, POWDER, LYOPHILIZED, FOR SOLUTION INTRAVENOUS EVERY 8 HOURS
Refills: 0 | Status: DISCONTINUED | OUTPATIENT
Start: 2023-04-06 | End: 2023-04-07

## 2023-04-06 RX ORDER — ENOXAPARIN SODIUM 100 MG/ML
40 INJECTION SUBCUTANEOUS EVERY 24 HOURS
Refills: 0 | Status: DISCONTINUED | OUTPATIENT
Start: 2023-04-06 | End: 2023-04-07

## 2023-04-06 RX ADMIN — OXYCODONE HYDROCHLORIDE 5 MILLIGRAM(S): 5 TABLET ORAL at 11:27

## 2023-04-06 RX ADMIN — ONDANSETRON 4 MILLIGRAM(S): 8 TABLET, FILM COATED ORAL at 07:27

## 2023-04-06 RX ADMIN — Medication 975 MILLIGRAM(S): at 14:13

## 2023-04-06 RX ADMIN — SODIUM CHLORIDE 1000 MILLILITER(S): 9 INJECTION, SOLUTION INTRAVENOUS at 13:57

## 2023-04-06 RX ADMIN — IOHEXOL 30 MILLILITER(S): 300 INJECTION, SOLUTION INTRAVENOUS at 07:58

## 2023-04-06 RX ADMIN — Medication 975 MILLIGRAM(S): at 21:00

## 2023-04-06 RX ADMIN — OXYCODONE HYDROCHLORIDE 5 MILLIGRAM(S): 5 TABLET ORAL at 12:30

## 2023-04-06 RX ADMIN — SODIUM CHLORIDE 125 MILLILITER(S): 9 INJECTION, SOLUTION INTRAVENOUS at 11:27

## 2023-04-06 RX ADMIN — PIPERACILLIN AND TAZOBACTAM 25 GRAM(S): 4; .5 INJECTION, POWDER, LYOPHILIZED, FOR SOLUTION INTRAVENOUS at 19:08

## 2023-04-06 RX ADMIN — MORPHINE SULFATE 4 MILLIGRAM(S): 50 CAPSULE, EXTENDED RELEASE ORAL at 07:29

## 2023-04-06 RX ADMIN — PIPERACILLIN AND TAZOBACTAM 200 GRAM(S): 4; .5 INJECTION, POWDER, LYOPHILIZED, FOR SOLUTION INTRAVENOUS at 11:27

## 2023-04-06 RX ADMIN — Medication 400 MILLIGRAM(S): at 09:00

## 2023-04-06 RX ADMIN — Medication 40 MILLIEQUIVALENT(S): at 15:15

## 2023-04-06 RX ADMIN — Medication 100 MILLIEQUIVALENT(S): at 14:13

## 2023-04-06 RX ADMIN — SODIUM CHLORIDE 1000 MILLILITER(S): 9 INJECTION INTRAMUSCULAR; INTRAVENOUS; SUBCUTANEOUS at 07:26

## 2023-04-06 RX ADMIN — Medication 975 MILLIGRAM(S): at 22:25

## 2023-04-06 RX ADMIN — Medication 1000 MILLIGRAM(S): at 09:15

## 2023-04-06 RX ADMIN — Medication 100 MILLIGRAM(S): at 07:27

## 2023-04-06 RX ADMIN — MORPHINE SULFATE 4 MILLIGRAM(S): 50 CAPSULE, EXTENDED RELEASE ORAL at 08:14

## 2023-04-06 NOTE — ED PROVIDER NOTE - DIFFERENTIAL DIAGNOSIS
Differential Diagnosis SBO, intra abdominal infection, Gastroenteritis, gastritis, dehydration, bleeding

## 2023-04-06 NOTE — ED PROVIDER NOTE - OBJECTIVE STATEMENT
32-year-old female, history of gastric sleeve done 1 year ago, at outside institution, presenting with chief complaint of generalized abdominal pain.  Onset 3 days ago.  Greatest in the right lower quadrant, however diffuse.  Initially intermittent, with constant dull pain with intermittent exacerbations.  Now constant and more severe.  Was seen in [place] 2 days ago, given antibiotics as well as Auropan.  no amelioration of pain with same.  No regular medications.  No allergies to medications.

## 2023-04-06 NOTE — ED PROVIDER NOTE - NS ED ATTENDING STATEMENT MOD
This was a shared visit with the ISAAK. I reviewed and verified the documentation and independently performed the documented:

## 2023-04-06 NOTE — ED ADULT NURSE NOTE - OBJECTIVE STATEMENT
Patient received in ED room 15. A&Ox4 and amb to self. C/o generalized abdominal pain and intermittent SOB x 3 days. Endorsing 9/10 abdominal pain with lightheadedness at this time. Denies CP, nausea, vomiting, headache, fever or chills. Pt states returned from [place] around midnight this morning. Placed on bedside cardiac monitor - NSR. Pt appears uncomfortable sitting up in stretcher, HOB elevated. Speaking in full and complete sentences. IV 18g placed to left AC, labs drawn and sent as ordered. Bed in lowest position, call bell in reach, wheels locked, safety maintained. Awaiting further orders.

## 2023-04-06 NOTE — H&P ADULT - ASSESSMENT
33yo F with h/o sleeve gastrectomy (May 2022) p/w abdominal pain found to have acute cholecystitis.    PLAN:  - Admit to B team surgery, Dr. Mario Alberto Mackey, floor bed  - Added to OR for lap peyman, possible open, currently #9 on add-on list  - NPO/IVF  - Zosyn  - Pain meds    D/w Dr. Narendra De La Torre, PGY3  B Team Surgery   [id number]

## 2023-04-06 NOTE — ED PROVIDER NOTE - NS ED ROS FT
GENERAL: no fever  EYES: no eye pain  HEENT: no neck pain  CARDIAC: no chest pain  PULMONARY: no SOB  GI:   Positive for abdominal pain  : no dysuria  SKIN: no rashes  NEURO: no headache  MSK: no new joint pain

## 2023-04-06 NOTE — ED PROVIDER NOTE - ATTENDING APP SHARED VISIT CONTRIBUTION OF CARE
I performed a history and physical exam of the patient and discussed their management with the resident and /or advanced care provider. I reviewed the resident and /or ACP's note and agree with the documented findings and plan of care. My medical decision making and observations are found above.  Lungs clear, rt upper pain

## 2023-04-06 NOTE — ED ADULT TRIAGE NOTE - CHIEF COMPLAINT QUOTE
Pt came back from [place] last night c/o abdominal pain, nausea, vomiting since three days ago. She was seen by a doctor in [place] and was given  Auropan plus , Pantonex . Pt appears uncomfortable. Denies fever or diarrhea. PMH of Gastric sleeve last year.

## 2023-04-06 NOTE — PATIENT PROFILE ADULT - FALL HARM RISK - UNIVERSAL INTERVENTIONS
Bed in lowest position, wheels locked, appropriate side rails in place/Call bell, personal items and telephone in reach/Instruct patient to call for assistance before getting out of bed or chair/Non-slip footwear when patient is out of bed/[place] to call system/Physically safe environment - no spills, clutter or unnecessary equipment/Purposeful Proactive Rounding/Room/bathroom lighting operational, light cord in reach

## 2023-04-06 NOTE — ED PROVIDER NOTE - IV ALTEPLASE DOOR HIDDEN
Reason for call:  Patient reporting a symptom    Symptom or request: high BP    Duration (how long have symptoms been present): ongoing    Have you been treated for this before? Yes    Additional comments: pt has ongoing high ongoing BP. Pt did not give anymore info just wanted to speak with BI and that's it. Trans to triage     Phone Number patient can be reached at:  Home number on file [phone] (home)    Best Time:  any    Can we leave a detailed message on this number:  YES    Call taken on 8/8/2017 at 2:17 PM by Hiren Mooney         show

## 2023-04-06 NOTE — H&P ADULT - NSHPLABSRESULTS_GEN_ALL_CORE
LABS:                          10.9   15.64 )-----------( 296      ( 06 Apr 2023 07:02 )             35.8       04-06    137  |  102  |  5<L>  ----------------------------<  123<H>  4.0   |  23  |  0.55    Ca    9.0      06 Apr 2023 07:02  Phos  2.6     04-06  Mg     1.90     04-06    TPro  6.9  /  Alb  4.0  /  TBili  0.7  /  DBili  x   /  AST  18  /  ALT  6   /  AlkPhos  86  04-06          _______________________________________  RADIOLOGY & ADDITIONAL STUDIES:    < from: CT Abdomen and Pelvis w/ Oral Cont and w/ IV Cont (04.06.23 @ 10:03) >    ACC: [id number] EXAM:  CT ABDOMEN AND PELVIS OC IC   ORDERED BY: DEBORA ESCUDERO     PROCEDURE DATE:  04/06/2023          INTERPRETATION:  CLINICAL INFORMATION: Diffuse abdominal pain. History of   gastric sleeve.    COMPARISON: None.    CONTRAST/COMPLICATIONS:  IV Contrast: Omnipaque 350  90 cc administered   10 cc discarded  Oral Contrast: NONE  Complications: None reported at time of study completion    PROCEDURE:  CT of the Abdomen and Pelvis was performed.  Sagittal and coronal reformats were performed.    FINDINGS:  LOWER CHEST: Within normal limits.    LIVER: Within normal limits.  BILE DUCTS: Normal caliber.  GALLBLADDER: Cholelithiasis. Mural thickening and surrounding   inflammation of the gallbladder.  SPLEEN: Within normal limits.  PANCREAS: Within normal limits.  ADRENALS: Within normal limits.  KIDNEYS/URETERS: Within normal limits.    BLADDER: Within normal limits.  REPRODUCTIVE ORGANS: Uterus and adnexa within normal limits.    BOWEL: Gastric sleeve. No bowel obstruction. Appendix is normal.  PERITONEUM: Small volume pelvic free fluid.  VESSELS: Within normal limits.  RETROPERITONEUM/LYMPH NODES: No lymphadenopathy.  ABDOMINAL WALL: Within normal limits.  BONES: Degenerative changes.    IMPRESSION:  Acute cholecystitis.    < end of copied text >

## 2023-04-06 NOTE — ED PROVIDER NOTE - CLINICAL SUMMARY MEDICAL DECISION MAKING FREE TEXT BOX
Alicia: Patient presents with N/V and rt upper pain, Patient with gastric sleeve, no bleeding noted. seen in [place] given meds which ar not helping. Patient looks dry. Will hydrate and get labs. Lab studies ordered, independently reviewed and acted on as appropriate. Surgery to see for gastric sleeve Emanuel Hale MD (PGY-2):  32-year-old female, history of gastric sleeve, presenting with diffuse abdominal tenderness.  Vital signs are unremarkable.  The concern is for anastomotic leak versus marginal ulcer versus small versus large bowel obstruction.  The consideration for acute cholestatic disease in a patient with history of bariatric surgery is elevated, will evaluate for same via CT, follow-up with right upper quadrant if equivocal.  Urged early surgical consultation given poor sensitivity of CT for anastomotic leak.  Rule out metabolic derangements.  No concern for pulmonary embolism, treat empirically for thiamine deficiency in a patient with high risk for same. CT, cmp, cbc, surgery consultation, thiamine, fluids, symptom control.     Alicia: Patient presents with N/V and rt upper pain, Patient with gastric sleeve, no bleeding noted. seen in [place] given meds which ar not helping. Patient looks dry. Will hydrate and get labs. Lab studies ordered, independently reviewed and acted on as appropriate. Surgery to see for gastric sleeve

## 2023-04-06 NOTE — ED PROVIDER NOTE - PROGRESS NOTE DETAILS
Emanuel Hale MD (PGY-2): Surgery consulted, required additional pain medication, IV Tylenol ordered.  Leukocytosis with associated neutrophilia, CHEM panel unremarkable.  Pending scan. Emanuel Hale MD (PGY-2): admission for acute peyman. pt updated. clinically stable. pain well managed.

## 2023-04-06 NOTE — H&P ADULT - HISTORY OF PRESENT ILLNESS
33yo F with h/o sleeve gastrectomy (May 2022) p/w abdominal pain. Reports 3 days of RLQ abd pain, associated with nausea but no vomiting. Denies fevers, chills, dysuria or hematura. Has never had similar pain before. 33yo F with h/o sleeve gastrectomy (May 2022) p/w abdominal pain. Reports 3 days of RLQ abd pain, associated with nausea but no vomiting. Denies fevers, chills, dysuria or hematuria. Has never had similar pain before.

## 2023-04-06 NOTE — ED ADULT TRIAGE NOTE - HAVE YOU RECEIVED AT LEAST TWO PFIZER AND/OR MODERNA VACCINATIONS (IN ANY COMBINATION) AND/OR ONE JOHNSON & JOHNSON VACCINATION?
Labs drawn from port a cath  Pt waiting here for results, he states the doctor is watching his platelet count  Yes

## 2023-04-06 NOTE — H&P ADULT - NSHPPHYSICALEXAM_GEN_ALL_CORE
VITALS:  Vital Signs Last 24 Hrs  T(C): 37.2 (06 Apr 2023 08:52), Max: 37.2 (06 Apr 2023 06:12)  T(F): 98.9 (06 Apr 2023 08:52), Max: 98.9 (06 Apr 2023 06:12)  HR: 64 (06 Apr 2023 08:52) (64 - 85)  BP: 106/58 (06 Apr 2023 08:52) (106/58 - 111/72)  BP(mean): --  RR: 18 (06 Apr 2023 08:52) (18 - 18)  SpO2: 99% (06 Apr 2023 08:52) (99% - 100%)    Parameters below as of 06 Apr 2023 08:52  Patient On (Oxygen Delivery Method): room air        PHYSICAL EXAM:  Gen: No acute distress.  Abd: Soft, moderate RUQ tenderness, +Garcia's signs, nondistended, no rebound, no guarding.  Ext: Warm and well-perfused

## 2023-04-06 NOTE — H&P ADULT - ATTENDING COMMENTS
likely weight loss from recent gastric sleeve procedure has made more symptomatic her gallstone disease  she now has acute calculous cholecystitis  admit, npo, ivf, iv antibiotics  OR pending availability

## 2023-04-06 NOTE — ED ADULT NURSE NOTE - CHIEF COMPLAINT QUOTE
Pt came back from [place] last night c/o abdominal pain, nausea, vomiting since three days ago. She was seen by a doctor in [place] and was given  Auropan plus , Pantonex . Pt appears uncomfortable. Denies fever or diarrhea. PMH of Gastric sleeve last year.

## 2023-04-07 ENCOUNTER — TRANSCRIPTION ENCOUNTER (OUTPATIENT)
Age: 33
End: 2023-04-07

## 2023-04-07 VITALS
DIASTOLIC BLOOD PRESSURE: 57 MMHG | SYSTOLIC BLOOD PRESSURE: 95 MMHG | HEART RATE: 54 BPM | TEMPERATURE: 98 F | OXYGEN SATURATION: 100 % | RESPIRATION RATE: 16 BRPM

## 2023-04-07 LAB
ALBUMIN SERPL ELPH-MCNC: 3.2 G/DL — LOW (ref 3.3–5)
ALP SERPL-CCNC: 73 U/L — SIGNIFICANT CHANGE UP (ref 40–120)
ALT FLD-CCNC: 11 U/L — SIGNIFICANT CHANGE UP (ref 4–33)
ANION GAP SERPL CALC-SCNC: 11 MMOL/L — SIGNIFICANT CHANGE UP (ref 7–14)
AST SERPL-CCNC: 25 U/L — SIGNIFICANT CHANGE UP (ref 4–32)
BILIRUB SERPL-MCNC: 0.8 MG/DL — SIGNIFICANT CHANGE UP (ref 0.2–1.2)
BUN SERPL-MCNC: 7 MG/DL — SIGNIFICANT CHANGE UP (ref 7–23)
CALCIUM SERPL-MCNC: 8.7 MG/DL — SIGNIFICANT CHANGE UP (ref 8.4–10.5)
CHLORIDE SERPL-SCNC: 102 MMOL/L — SIGNIFICANT CHANGE UP (ref 98–107)
CO2 SERPL-SCNC: 22 MMOL/L — SIGNIFICANT CHANGE UP (ref 22–31)
CREAT SERPL-MCNC: 0.57 MG/DL — SIGNIFICANT CHANGE UP (ref 0.5–1.3)
CULTURE RESULTS: NO GROWTH — SIGNIFICANT CHANGE UP
EGFR: 125 ML/MIN/1.73M2 — SIGNIFICANT CHANGE UP
GLUCOSE SERPL-MCNC: 135 MG/DL — HIGH (ref 70–99)
HCT VFR BLD CALC: 32.2 % — LOW (ref 34.5–45)
HGB BLD-MCNC: 9.6 G/DL — LOW (ref 11.5–15.5)
MAGNESIUM SERPL-MCNC: 1.7 MG/DL — SIGNIFICANT CHANGE UP (ref 1.6–2.6)
MCHC RBC-ENTMCNC: 21.5 PG — LOW (ref 27–34)
MCHC RBC-ENTMCNC: 29.8 GM/DL — LOW (ref 32–36)
MCV RBC AUTO: 72.2 FL — LOW (ref 80–100)
NRBC # BLD: 0 /100 WBCS — SIGNIFICANT CHANGE UP (ref 0–0)
NRBC # FLD: 0 K/UL — SIGNIFICANT CHANGE UP (ref 0–0)
PHOSPHATE SERPL-MCNC: 3.1 MG/DL — SIGNIFICANT CHANGE UP (ref 2.5–4.5)
PLATELET # BLD AUTO: 248 K/UL — SIGNIFICANT CHANGE UP (ref 150–400)
POTASSIUM SERPL-MCNC: 4.1 MMOL/L — SIGNIFICANT CHANGE UP (ref 3.5–5.3)
POTASSIUM SERPL-SCNC: 4.1 MMOL/L — SIGNIFICANT CHANGE UP (ref 3.5–5.3)
PROT SERPL-MCNC: 5.9 G/DL — LOW (ref 6–8.3)
RBC # BLD: 4.46 M/UL — SIGNIFICANT CHANGE UP (ref 3.8–5.2)
RBC # FLD: 16.3 % — HIGH (ref 10.3–14.5)
SODIUM SERPL-SCNC: 135 MMOL/L — SIGNIFICANT CHANGE UP (ref 135–145)
SPECIMEN SOURCE: SIGNIFICANT CHANGE UP
WBC # BLD: 14.73 K/UL — HIGH (ref 3.8–10.5)
WBC # FLD AUTO: 14.73 K/UL — HIGH (ref 3.8–10.5)

## 2023-04-07 PROCEDURE — 93010 ELECTROCARDIOGRAM REPORT: CPT

## 2023-04-07 RX ORDER — SODIUM CHLORIDE 9 MG/ML
500 INJECTION, SOLUTION INTRAVENOUS
Refills: 0 | Status: DISCONTINUED | OUTPATIENT
Start: 2023-04-06 | End: 2023-04-07

## 2023-04-07 RX ORDER — ACETAMINOPHEN 500 MG
3 TABLET ORAL
Qty: 0 | Refills: 0 | DISCHARGE
Start: 2023-04-07

## 2023-04-07 RX ORDER — OXYCODONE HYDROCHLORIDE 5 MG/1
1 TABLET ORAL
Qty: 12 | Refills: 0
Start: 2023-04-07

## 2023-04-07 RX ORDER — MAGNESIUM SULFATE 500 MG/ML
2 VIAL (ML) INJECTION ONCE
Refills: 0 | Status: COMPLETED | OUTPATIENT
Start: 2023-04-07 | End: 2023-04-07

## 2023-04-07 RX ADMIN — Medication 25 GRAM(S): at 10:50

## 2023-04-07 RX ADMIN — Medication 975 MILLIGRAM(S): at 10:50

## 2023-04-07 RX ADMIN — Medication 975 MILLIGRAM(S): at 03:16

## 2023-04-07 RX ADMIN — Medication 975 MILLIGRAM(S): at 03:46

## 2023-04-07 RX ADMIN — PIPERACILLIN AND TAZOBACTAM 25 GRAM(S): 4; .5 INJECTION, POWDER, LYOPHILIZED, FOR SOLUTION INTRAVENOUS at 03:17

## 2023-04-07 RX ADMIN — Medication 975 MILLIGRAM(S): at 11:20

## 2023-04-07 RX ADMIN — SODIUM CHLORIDE 500 MILLILITER(S): 9 INJECTION, SOLUTION INTRAVENOUS at 00:03

## 2023-04-07 NOTE — DISCHARGE NOTE NURSING/CASE MANAGEMENT/SOCIAL WORK - NSDCPEFALRISK_GEN_ALL_CORE
For information on Fall & Injury Prevention, visit: [email or web address] OR  [email or web address] OR  https://www.cdc.gov/steadi/patient.html

## 2023-04-07 NOTE — DISCHARGE NOTE NURSING/CASE MANAGEMENT/SOCIAL WORK - NSDCPNINST_GEN_ALL_CORE
Follow up with primary care physician. Monitor for signs of infection such as pain, swelling, temp greater than 100.4, notify MD.

## 2023-04-07 NOTE — DISCHARGE NOTE PROVIDER - NSDCMRMEDTOKEN_GEN_ALL_CORE_FT
acetaminophen 325 mg oral tablet: 3 tab(s) orally every 6 hours   acetaminophen 325 mg oral tablet: 3 tab(s) orally every 6 hours  amoxicillin-clavulanate 875 mg-125 mg oral tablet: 1 tab(s) orally every 12 hours  oxyCODONE 5 mg oral tablet: 1 tab(s) orally every 4 hours MDD: 6 tabs

## 2023-04-07 NOTE — BRIEF OPERATIVE NOTE - OPERATION/FINDINGS
Laparoscopic cholecystectomy. Needle decompression of gallbladder prior to dissection significant for evidence of hydrops. Critical view obtained prior to clipping of cystic duct and artery. Laparoscopic cholecystectomy. Needle decompression of gallbladder prior to dissection significant for evidence of hydrops. Critical view obtained prior to clipping of cystic duct and artery. Some spillage of pus from gallbladder during dissection.

## 2023-04-07 NOTE — DISCHARGE NOTE PROVIDER - NSDCFUADDAPPT_GEN_ALL_CORE_FT
Please follow-up with your surgeon, Dr. Mackey within 7 days following discharge- please call to schedule an appointment.

## 2023-04-07 NOTE — DISCHARGE NOTE NURSING/CASE MANAGEMENT/SOCIAL WORK - PATIENT PORTAL LINK FT
You can access the FollowMyHealth Patient Portal offered by [facility] by registering at the following website: [email or web address]. By joining [employer]’s FollowMyHealth portal, you will also be able to view your health information using other applications (apps) compatible with our system.

## 2023-04-07 NOTE — CHART NOTE - NSCHARTNOTEFT_GEN_A_CORE
Called to evaluate patient at bedside to do persistent hypotension in PACU. Patient AAOx4, mentating appropriately. Patient feels light headed and endorses chest tightness. Blood pressures persistently 80s/50s with HR 50s-60s. Patient says that her blood pressure runs low at baseline; BP 90s/40s earlier today. 12 lead EKG performed showing sinus bradycardia, without ST segment changes from baseline EKG.  and patient received 1.75 L crystalloid and 250 mL 5% albumin intraoperatively. Patient given 500 mL LR bolus n PACU without significant change in blood pressure. Patient subjectively feeling improved.

## 2023-04-07 NOTE — DISCHARGE NOTE PROVIDER - CARE PROVIDER_API CALL
Mario Alberto Mackey)  Surgery; Surgical Critical Care  [phone] [place] [address]  Phone: [phone]  Fax: [phone]  Follow Up Time:

## 2023-04-07 NOTE — DISCHARGE NOTE PROVIDER - NSDCCPCAREPLAN_GEN_ALL_CORE_FT
PRINCIPAL DISCHARGE DIAGNOSIS  Diagnosis: Acute cholecystitis  Assessment and Plan of Treatment: WOUND CARE:  Please keep incisions clean and dry. Please do not Scrub or rub incisions. Do not use lotion or powder on incisions.   BATHING: Please do not submerge wound underwater. You may shower and/or sponge bathe.  ACTIVITY: No heavy lifting or straining. Otherwise, you may return to your usual level of physical activity. If you are taking narcotic pain medication (such as Percocet) DO NOT drive a car, operate machinery or make important decisions.  DIET: Return to your usual diet.  NOTIFY YOUR SURGEON IF: You have any bleeding that does not stop, any pus draining from your wound(s), any fever (over 100.4 F) or chills, persistent nausea/vomiting, persistent diarrhea, or if your pain is not controlled on your discharge pain medications.  FOLLOW-UP: Please follow up with your primary care physician in one week regarding your hospitalization. Please follow-up with your surgeon, within 7 days following discharge- please call to schedule an appointment.

## 2023-04-07 NOTE — CHART NOTE - NSCHARTNOTEFT_GEN_A_CORE
Post-op Check    Procedure: S/P lap peyman    Subjective: Patient resting comfortably in bed, NAD. Denies fever, chills, CP, SOB, n/v, abdominal pain     Objective:  Vitals: T(F): 97.8 (04-07-23 @ 00:00), Max: 100.1 (04-06-23 @ 22:25)  HR: 58 (04-07-23 @ 00:45)  BP: 97/55 (04-07-23 @ 00:45) (88/55 - 112/73)  RR: 19 (04-07-23 @ 00:45)  SpO2: 100% (04-07-23 @ 00:45)  Vent Settings:     In:   04-06-23 @ 07:01  -  04-07-23 @ 01:02  --------------------------------------------------------  IN: 725 mL      IV Fluids: lactated ringers. 500 milliLiter(s) (500 mL/Hr) IV Continuous <Continuous>  lactated ringers. 1000 milliLiter(s) (125 mL/Hr) IV Continuous <Continuous>      Out:   04-06-23 @ 07:01  -  04-07-23 @ 01:02  --------------------------------------------------------  OUT: 0 mL       Voided Urine:   04-06-23 @ 07:01  -  04-07-23 @ 01:02  --------------------------------------------------------  OUT: 0 mL       Physical Examination:  General: NAD, resting comfortably in bed  HEENT: Normocephalic atraumatic  Respiratory: Nonlabored respirations, normal CW expansion.  Cardio: S1S2, regular rate and rhythm.  Abdomen: softly distended, appropriately tender, surgical incisions are c/d/i.    Vascular: extremities are warm and well perfused.     Imaging:  No post-op imaging studies    Assessment:  32yFemale S/P lap peyman    Plan:    - Labs: f/u AM labs  - Diet: advance as tolerated   - Activity: OOB with assistance  - Pain medication as needed   - Incentive spirometry   - DVT PPX: LVX  - Dispo: Floor Post-op Check    Procedure: S/P lap peyman    Subjective: Patient resting comfortably in bed, NAD. Denies fever, chills, CP, SOB, n/v, abdominal pain     Objective:  Vitals: T(F): 97.8 (04-07-23 @ 00:00), Max: 100.1 (04-06-23 @ 22:25)  HR: 58 (04-07-23 @ 00:45)  BP: 97/55 (04-07-23 @ 00:45) (88/55 - 112/73)  RR: 19 (04-07-23 @ 00:45)  SpO2: 100% (04-07-23 @ 00:45)  Vent Settings:     In:   04-06-23 @ 07:01  -  04-07-23 @ 01:02  --------------------------------------------------------  IN: 725 mL      IV Fluids: lactated ringers. 500 milliLiter(s) (500 mL/Hr) IV Continuous <Continuous>  lactated ringers. 1000 milliLiter(s) (125 mL/Hr) IV Continuous <Continuous>      Out:   04-06-23 @ 07:01  -  04-07-23 @ 01:02  --------------------------------------------------------  OUT: 0 mL       Voided Urine:   04-06-23 @ 07:01  -  04-07-23 @ 01:02  --------------------------------------------------------  OUT: 0 mL       Physical Examination:  General: NAD, resting comfortably in bed  HEENT: Normocephalic atraumatic  Respiratory: Nonlabored respirations, normal CW expansion.  Cardio: S1S2, regular rate and rhythm.  Abdomen: softly distended, appropriately tender, surgical incisions are c/d/i.    Vascular: extremities are warm and well perfused.     Imaging:  No post-op imaging studies    Assessment:  32yFemale S/P lap peyman. Received 500cc bolus for soft BP w/o tacycardia. EKG sinus bradycardia    Plan:    - Labs: f/u AM labs  - Diet: advance as tolerated   - Activity: OOB with assistance  - Pain medication as needed   - Incentive spirometry   - DVT PPX: LVX  - Dispo: Floor

## 2023-04-07 NOTE — DISCHARGE NOTE NURSING/CASE MANAGEMENT/SOCIAL WORK - NSDCPETBCESMAN_GEN_ALL_CORE
If you are a smoker, it is important for your health to stop smoking. Please be aware that second hand smoke is also harmful.
Jn

## 2023-04-07 NOTE — DISCHARGE NOTE PROVIDER - HOSPITAL COURSE
33yo F with h/o sleeve gastrectomy (May 2022) presented with abdominal pain.     Admission CT Abd/Pelvis: Acute cholecystitis.    Patient was admitted to the surgical service, made NPO and started on IVF/IV antibiotics.    She was taken to the operating room 4/7/23 and underwent laparoscopic cholecystectomy. Pt tolerated procedure well, without complication. Pt remained hemodynamically stable in the PACU and transferred to the surgical floor. Diet was restarted and advanced as tolerated. Pain control was transitioned from IV to PO pain meds.     Pt currently ambulating, voiding, tolerating a regular diet, with pain well controlled on PO pain meds. Patient is stable for discharge home to follow up as an outpatient, instructed to call to schedule appointment.

## 2023-04-19 LAB — SURGICAL PATHOLOGY STUDY: SIGNIFICANT CHANGE UP

## 2023-04-28 ENCOUNTER — APPOINTMENT (OUTPATIENT)
Dept: TRAUMA SURGERY | Facility: HOSPITAL | Age: 33
End: 2023-04-28
Payer: COMMERCIAL

## 2023-04-28 VITALS
BODY MASS INDEX: 28.71 KG/M2 | HEART RATE: 64 BPM | TEMPERATURE: 97.9 F | HEIGHT: 62 IN | WEIGHT: 156 LBS | DIASTOLIC BLOOD PRESSURE: 62 MMHG | SYSTOLIC BLOOD PRESSURE: 103 MMHG

## 2023-04-28 PROCEDURE — 99024 POSTOP FOLLOW-UP VISIT: CPT

## 2023-04-28 NOTE — ASSESSMENT
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

## 2023-04-28 NOTE — PHYSICAL EXAM
[Alert] : alert [Oriented to Person] : oriented to person [Oriented to Place] : oriented to place [Oriented to Time] : oriented to time [Calm] : calm [de-identified] : well appearing middle aged woman [de-identified] : anicteric [de-identified] : supple [de-identified] : normal effort [de-identified] : soft, NT/ND, incisions healed without hernias [de-identified] : no jaundice [de-identified] : no calf tenderness, no ankle edema

## 2023-05-12 ENCOUNTER — EMERGENCY (EMERGENCY)
Facility: HOSPITAL | Age: 33
LOS: 1 days | Discharge: HOME | End: 2023-05-12
Attending: STUDENT IN AN ORGANIZED HEALTH CARE EDUCATION/TRAINING PROGRAM | Admitting: STUDENT IN AN ORGANIZED HEALTH CARE EDUCATION/TRAINING PROGRAM
Payer: COMMERCIAL

## 2023-05-12 VITALS
DIASTOLIC BLOOD PRESSURE: 61 MMHG | HEART RATE: 97 BPM | OXYGEN SATURATION: 100 % | RESPIRATION RATE: 20 BRPM | TEMPERATURE: 98 F | SYSTOLIC BLOOD PRESSURE: 104 MMHG

## 2023-05-12 VITALS
TEMPERATURE: 98 F | HEART RATE: 73 BPM | OXYGEN SATURATION: 99 % | SYSTOLIC BLOOD PRESSURE: 99 MMHG | DIASTOLIC BLOOD PRESSURE: 63 MMHG | RESPIRATION RATE: 16 BRPM

## 2023-05-12 DIAGNOSIS — Z90.3 ACQUIRED ABSENCE OF STOMACH [PART OF]: Chronic | ICD-10-CM

## 2023-05-12 LAB
ALBUMIN SERPL ELPH-MCNC: 3.8 G/DL — SIGNIFICANT CHANGE UP (ref 3.3–5)
ALP SERPL-CCNC: 94 U/L — SIGNIFICANT CHANGE UP (ref 40–120)
ALT FLD-CCNC: <5 U/L — SIGNIFICANT CHANGE UP (ref 4–33)
ANION GAP SERPL CALC-SCNC: 12 MMOL/L — SIGNIFICANT CHANGE UP (ref 7–14)
ANISOCYTOSIS BLD QL: SLIGHT — SIGNIFICANT CHANGE UP
AST SERPL-CCNC: 12 U/L — SIGNIFICANT CHANGE UP (ref 4–32)
BASOPHILS # BLD AUTO: 0 K/UL — SIGNIFICANT CHANGE UP (ref 0–0.2)
BASOPHILS NFR BLD AUTO: 0 % — SIGNIFICANT CHANGE UP (ref 0–2)
BILIRUB SERPL-MCNC: 0.3 MG/DL — SIGNIFICANT CHANGE UP (ref 0.2–1.2)
BUN SERPL-MCNC: 9 MG/DL — SIGNIFICANT CHANGE UP (ref 7–23)
CALCIUM SERPL-MCNC: 8.8 MG/DL — SIGNIFICANT CHANGE UP (ref 8.4–10.5)
CHLORIDE SERPL-SCNC: 106 MMOL/L — SIGNIFICANT CHANGE UP (ref 98–107)
CO2 SERPL-SCNC: 22 MMOL/L — SIGNIFICANT CHANGE UP (ref 22–31)
CREAT SERPL-MCNC: 0.69 MG/DL — SIGNIFICANT CHANGE UP (ref 0.5–1.3)
EGFR: 120 ML/MIN/1.73M2 — SIGNIFICANT CHANGE UP
ELLIPTOCYTES BLD QL SMEAR: SLIGHT — SIGNIFICANT CHANGE UP
EOSINOPHIL # BLD AUTO: 0.95 K/UL — HIGH (ref 0–0.5)
EOSINOPHIL NFR BLD AUTO: 9.7 % — HIGH (ref 0–6)
GIANT PLATELETS BLD QL SMEAR: PRESENT — SIGNIFICANT CHANGE UP
GLUCOSE SERPL-MCNC: 113 MG/DL — HIGH (ref 70–99)
HCG SERPL-ACNC: <1 MIU/ML — SIGNIFICANT CHANGE UP
HCT VFR BLD CALC: 37.7 % — SIGNIFICANT CHANGE UP (ref 34.5–45)
HGB BLD-MCNC: 11.3 G/DL — LOW (ref 11.5–15.5)
IANC: 5.62 K/UL — SIGNIFICANT CHANGE UP (ref 1.8–7.4)
LIDOCAIN IGE QN: 56 U/L — SIGNIFICANT CHANGE UP (ref 7–60)
LYMPHOCYTES # BLD AUTO: 3.14 K/UL — SIGNIFICANT CHANGE UP (ref 1–3.3)
LYMPHOCYTES # BLD AUTO: 31.9 % — SIGNIFICANT CHANGE UP (ref 13–44)
MANUAL SMEAR VERIFICATION: SIGNIFICANT CHANGE UP
MCHC RBC-ENTMCNC: 22 PG — LOW (ref 27–34)
MCHC RBC-ENTMCNC: 30 GM/DL — LOW (ref 32–36)
MCV RBC AUTO: 73.5 FL — LOW (ref 80–100)
MICROCYTES BLD QL: SLIGHT — SIGNIFICANT CHANGE UP
MONOCYTES # BLD AUTO: 0.18 K/UL — SIGNIFICANT CHANGE UP (ref 0–0.9)
MONOCYTES NFR BLD AUTO: 1.8 % — LOW (ref 2–14)
NEUTROPHILS # BLD AUTO: 5.13 K/UL — SIGNIFICANT CHANGE UP (ref 1.8–7.4)
NEUTROPHILS NFR BLD AUTO: 52.2 % — SIGNIFICANT CHANGE UP (ref 43–77)
OVALOCYTES BLD QL SMEAR: SLIGHT — SIGNIFICANT CHANGE UP
PLAT MORPH BLD: ABNORMAL
PLATELET # BLD AUTO: 320 K/UL — SIGNIFICANT CHANGE UP (ref 150–400)
PLATELET COUNT - ESTIMATE: NORMAL — SIGNIFICANT CHANGE UP
POIKILOCYTOSIS BLD QL AUTO: SLIGHT — SIGNIFICANT CHANGE UP
POLYCHROMASIA BLD QL SMEAR: SLIGHT — SIGNIFICANT CHANGE UP
POTASSIUM SERPL-MCNC: 3.8 MMOL/L — SIGNIFICANT CHANGE UP (ref 3.5–5.3)
POTASSIUM SERPL-SCNC: 3.8 MMOL/L — SIGNIFICANT CHANGE UP (ref 3.5–5.3)
PROT SERPL-MCNC: 6.9 G/DL — SIGNIFICANT CHANGE UP (ref 6–8.3)
RBC # BLD: 5.13 M/UL — SIGNIFICANT CHANGE UP (ref 3.8–5.2)
RBC # FLD: 16.2 % — HIGH (ref 10.3–14.5)
RBC BLD AUTO: ABNORMAL
SCHISTOCYTES BLD QL AUTO: SLIGHT — SIGNIFICANT CHANGE UP
SMUDGE CELLS # BLD: PRESENT — SIGNIFICANT CHANGE UP
SODIUM SERPL-SCNC: 140 MMOL/L — SIGNIFICANT CHANGE UP (ref 135–145)
VARIANT LYMPHS # BLD: 4.4 % — SIGNIFICANT CHANGE UP (ref 0–6)
WBC # BLD: 9.83 K/UL — SIGNIFICANT CHANGE UP (ref 3.8–10.5)
WBC # FLD AUTO: 9.83 K/UL — SIGNIFICANT CHANGE UP (ref 3.8–10.5)

## 2023-05-12 PROCEDURE — 99284 EMERGENCY DEPT VISIT MOD MDM: CPT

## 2023-05-12 PROCEDURE — 74177 CT ABD & PELVIS W/CONTRAST: CPT | Mod: 26,MA

## 2023-05-12 RX ORDER — ACETAMINOPHEN 500 MG
1000 TABLET ORAL ONCE
Refills: 0 | Status: COMPLETED | OUTPATIENT
Start: 2023-05-12 | End: 2023-05-12

## 2023-05-12 RX ORDER — FAMOTIDINE 10 MG/ML
20 INJECTION INTRAVENOUS ONCE
Refills: 0 | Status: COMPLETED | OUTPATIENT
Start: 2023-05-12 | End: 2023-05-12

## 2023-05-12 RX ADMIN — Medication 1000 MILLIGRAM(S): at 13:19

## 2023-05-12 RX ADMIN — Medication 30 MILLILITER(S): at 12:48

## 2023-05-12 RX ADMIN — Medication 400 MILLIGRAM(S): at 12:49

## 2023-05-12 RX ADMIN — FAMOTIDINE 20 MILLIGRAM(S): 10 INJECTION INTRAVENOUS at 12:49

## 2023-05-12 NOTE — CONSULT NOTE ADULT - SUBJECTIVE AND OBJECTIVE BOX
History of Present Illness:  HPI:  33 yo female PMH gastric sleeve (), lap cholecystectomy (), and  x 2 presents with 4 days of epigastric pain. Pain is dull and achy and worse with movement, which is relieved with tylenol and advil. Having chills. No nausea, vomiting, fever. Passing gas and having bowel movements. No sick contacts or recent travel. Has not experienced this pain before.      Past Medical History  No pertinent past medical history    Past Surgical History  H/O gastric sleeve    MEDICATIONS  (STANDING):    MEDICATIONS  (PRN):    Vital Signs Last 24 Hrs  T(C): 36.7 (23 @ 17:35), Max: 36.8 (- @ 12:00)  T(F): 98 (- @ 17:35), Max: 98.2 (-23 @ 12:00)  HR: 73 (23 @ 17:35) (73 - 97)  BP: 99/63 (--23 @ 17:35) (99/63 - 104/61)  RR: 16 (23 @ 17:35) (16 - 20)  SpO2: 99% (23 @ 17:35) (99% - 100%)           Daily     Daily   Admit Wt: Drug Dosing Weight  Height (cm): 157.5 (2021 23:27)    Allergies: No Known Allergies    SOCIAL HISTORY:  Smoker: [ ] Yes  [X] No                 Pt denies  ETOH use: [ ] Yes  [X] No             Pt denies  Ilicit Drug use:  [ ] Yes  [X] No     Pt denies    FAMILY HISTORY:    Review of Systems  Pertinent ROS described above.     PHYSICAL EXAM  General: NAD.                                              Neuro: Normal exam oriented to person/place & time with no focal motor or sensory  deficits.                    Eyes: Normal exam of conjunctiva & lids, pupils equally reactive.   ENT: Normal exam of nasal/oral mucosa with absence of cyanosis.   Neck: Normal exam of jugular veins, trachea & thyroid.   Chest: Normal lung exam with good air movement absence of wheezes, rales, or rhonchi.                                                                                                                                            GI: soft, non-distended, mildly tender above and below umbilicus                                                                                       Extremities: Normal no evidence of cyanosis or deformity, Edema: none  SKIN : Normal exam to inspection & palpation.                                                           LABS:                        11.3   9.83  )-----------( 320      ( 12 May 2023 13:04 )             37.7     05-12    140  |  106  |  9   ----------------------------<  113<H>  3.8   |  22  |  0.69    Ca    8.8      12 May 2023 13:04    TPro  6.9  /  Alb  3.8  /  TBili  0.3  /  DBili  x   /  AST  12  /  ALT  <5  /  AlkPhos  94  0512      Imaging:   CT A/P  IMPRESSION:  Subcutaneous stranding just above the umbilicus, consistent with   postoperative change. No evidence of drainable fluid collection.  Sleeve gastrectomy and cholecystectomy.

## 2023-05-12 NOTE — ED ADULT NURSE NOTE - NSFALLUNIVINTERV_ED_ALL_ED
Bed/Stretcher in lowest position, wheels locked, appropriate side rails in place/Call bell, personal items and telephone in reach/Instruct patient to call for assistance before getting out of bed/chair/stretcher/Non-slip footwear applied when patient is off stretcher/[place] to call system/Physically safe environment - no spills, clutter or unnecessary equipment/Purposeful proactive rounding/Room/bathroom lighting operational, light cord in reach

## 2023-05-12 NOTE — ED PROVIDER NOTE - PATIENT PORTAL LINK FT
You can access the FollowMyHealth Patient Portal offered by [facility] by registering at the following website: [email or web address]. By joining [employer]’s FollowMyHealth portal, you will also be able to view your health information using other applications (apps) compatible with our system.

## 2023-05-12 NOTE — ED PROVIDER NOTE - ATTENDING CONTRIBUTION TO CARE
I have personally performed a face to face medical and diagnostic evaluation of the patient. I have discussed with and reviewed the Resident's note and agree with the History, ROS, Physical Exam and MDM unless otherwise indicated. A brief summary of my personal evaluation and impression can be found below.    36y F s/p laparoscopic cholecystectomy on 4/8 presenting to the ED with periumbilical abdominal pain x3 days, non-radiating, constant in nature.  Patient states pain occurs both at rest and with active movement. Denies associated dizziness, nausea, vomiting, fever, chills, sob, cp, diarrhea, bloody stools, dysuria, hematuria. Patient localizes pain to laparoscopic incision site, no skin changes, no foul odor purulence coming from site.    Physical Exam:  Gen: NAD, AOx3, non-toxic appearing, able to ambulate without assistance  Head: NCAT  HEENT: normal conjunctiva, tongue midline, oral mucosa moist  Lung: CTAB, no respiratory distress, no wheezes/rhonchi/rales B/L, speaking in full sentences  CV: RRR, no murmurs, rubs or gallops  Abd: soft, +ttp in umbilical region and RLQ w/ mild guarding, no rigidity, no rebound tenderness, no CVA tenderness   MSK: no visible deformities, ROM normal in UE/LE, no back pain  Neuro: No focal sensory or motor deficits  Skin: Warm, well perfused, no rash, no leg swelling  Psych: normal affect, calm  Dora To D.O.    A/P: Differential includes appendicitis, gastritis, lower suspicion for surgical complications given acute onset of pain and surgical procedure 4/8. Will obtain CT A/P, labs, pain control, and reassess. Dispo pending imaging/lab results and reassessment.

## 2023-05-12 NOTE — ED PROVIDER NOTE - PHYSICAL EXAMINATION
General: Alert and Orientated x 3. No apparent distress.  Head: Normocephalic and atraumatic.  Eyes: PERRLA with EOMI.  Neck: Supple. Trachea midline.   Cardiac: Normal S1 and S2 w/ RRR. No murmurs appreciated. No JVD appreciated.  Pulmonary: CTA bilaterally. No increased WOB. No wheezes or crackles.  Abdominal: Soft, Tenderness to palpation to periumbilical region with mild tenderness to palpation to right lower quadrant.  Epigastric tenderness to palpation.  No distention.. (+) bowel sounds appreciated in all 4 quadrants. No hepatosplenomegaly.   Neurologic: No focal sensory or motor deficits.  Musculoskeletal: Strength appropriate in all 4 extremities for age with no limited ROM.  Skin:   No skin changes including erythema, purulence, induration around surgical incision sites.  Psychiatric: Appropriate mood and affect. No apparent risk to self or others.

## 2023-05-12 NOTE — CONSULT NOTE ADULT - ATTENDING COMMENTS
I have reviewed the history, pertinent labs and imaging, and discussed the care with the consult resident.  More than 50% of this 55 minute encounter including face to face with the patient was spent counseling and/or coordination of care on abdominal pain.  Time included review of vitals, labs, imaging, discussion with consultants and coordination with the operating room/emergency department.    31yo F with h/o sleeve presents with lower abdominal pain. Normal labs, CT without pathology, aside from possible small hiatal hernia, which is unlikely related to her symptoms. OK from surgical perspective to advance diet as tolerated.     - Advance diet as tolerated  - Follow up with primary surgeon     The active issues are:  1. lower abdominal pain    The Acute Care Surgery (B Team) Attending Group Practice:  Dr. Sharri Rodgers    urgent issues - spectra [phone]  nonurgent issues - [phone]  patient appointments or afterhours - [phone]

## 2023-05-12 NOTE — ED ADULT NURSE NOTE - NS ED NURSE DISCH DISPOSITION
ID#[id number] Discharged

## 2023-05-12 NOTE — ED ADULT NURSE NOTE - NS ED NURSE RECORD ANOTHER HT AND WT
Yes, record another set of Body Measurements Hydroquinone Counseling:  Patient advised that medication may result in skin irritation, lightening (hypopigmentation), dryness, and burning.  In the event of skin irritation, the patient was advised to reduce the amount of the drug applied or use it less frequently.  Rarely, spots that are treated with hydroquinone can become darker (pseudoochronosis).  Should this occur, patient instructed to stop medication and call the office. The patient verbalized understanding of the proper use and possible adverse effects of hydroquinone.  All of the patient's questions and concerns were addressed.

## 2023-05-12 NOTE — ED PROVIDER NOTE - OBJECTIVE STATEMENT
Patient is a 36-year-old female status post laparoscopic cholecystectomy on April 8 presenting to the ED with periumbilical abdominal pain x3 days, nonradiating, constan.  no associated nausea, vomiting, urinary or bowel changes.  No vaginal pain or bleeding, last menstrual period 2 weeks ago.   patient localizes pain to laparoscopic incision site, no skin changes, foul odor purulence coming from site.   Subjective fevers but no measurable temp.

## 2023-05-12 NOTE — ED PROVIDER NOTE - CLINICAL SUMMARY MEDICAL DECISION MAKING FREE TEXT BOX
32-year-old female status post laparoscopic cholecystectomy April 8  presenting to the ED with periumbilical pain x3 days.  Tactile fever,  no nausea, vomiting, urinary or bowel changes.  No skin changes around incision site.  Not taken anything for pain.  Vital stable.  Exam with periumbilical, epigastric and mild right lower quadrant tenderness to palpation.  Given location  concern for appendicitis.  Differential also includes postsurgical abscess,  UTI, hernia, gastritis.  will get CT abdomen pelvis, labs, treat with Tylenol and, Maalox, Pepcid.  Disposition pending imaging, labs and reassessment.

## 2023-05-12 NOTE — ED ADULT NURSE REASSESSMENT NOTE - NS ED NURSE REASSESS COMMENT FT1
Patient resting in stretcher A&Ox4, ambulatory denies complaints of chest pain, SOB, N,V,D dizziness at this time. States to have mild dull, constant pain to the mid abdomen, does not want pain medication at this time.  RR equal and unlabored. VS stable.  Awaiting results. Care plan continued. Comfort measures provided. Safety maintained.

## 2023-05-12 NOTE — ED ADULT NURSE NOTE - OBJECTIVE STATEMENT
Pt Axo 4 and ambulatory. Pt c/o abdomina pain to the umbilical region, and at her incision site. no redness vis able on assessment. Abdomen is soft, tender, and nondistended. Pt states she received a cholecystectomy last month. respirations are even and unlabored. pt states last bowel movement was yesterday, and denies difficulty on urination. last menstrual cycle was in April. pt denies chest pain, SOB, N/V, dizziness, or headaches. pt has a 20 g to the left AC. Medicated as prescribed. Will continue to monitor.

## 2023-05-12 NOTE — CONSULT NOTE ADULT - ASSESSMENT
31 yo female PMH gastric sleeve (), lap cholecystectomy (), and  x 2 presents with 4 days of epigastric pain. CT A/P and abdominal laboratory workup unremarkable.    No surgical intervention     33 yo female PMH gastric sleeve (), lap cholecystectomy (), and  x 2 presents with 4 days of epigastric pain. CT A/P and abdominal laboratory workup unremarkable.    Plan:  - No surgical intervention     33 yo female PMH gastric sleeve (), lap cholecystectomy (), and  x 2 presents with 4 days of epigastric pain. CT A/P and abdominal laboratory workup unremarkable.    Plan:       31 yo female PMH gastric sleeve (), lap cholecystectomy (), and  x 2 presents with 4 days of epigastric pain. CT A/P and abdominal laboratory workup unremarkable.    Plan:  - No acute findings, no urgent intervention or admission indicated  - Okay for discharge with outpatient fu    Discussed with Dr. Vishal Saez  General Surgery  B Team  [id number]

## 2023-05-12 NOTE — ED PROVIDER NOTE - PROGRESS NOTE DETAILS
Viviana Avendaño MD (PGY2): CT without acute findings.  Given patient has history of sleeve gastrectomy in 2022, and pain is epigastric on exam bariatric surgery consulted for evaluation.  Team evaluated patient and at this time believe patient is safe for discharge.  Strict return precautions discussed.  Vitals stable.  Patient to follow-up with her surgeon.

## 2023-09-28 ENCOUNTER — EMERGENCY (EMERGENCY)
Facility: HOSPITAL | Age: 33
LOS: 1 days | Discharge: LEFT BEFORE DISCHARGE | End: 2023-09-28
Attending: EMERGENCY MEDICINE | Admitting: EMERGENCY MEDICINE
Payer: MEDICAID

## 2023-09-28 VITALS
SYSTOLIC BLOOD PRESSURE: 99 MMHG | RESPIRATION RATE: 16 BRPM | DIASTOLIC BLOOD PRESSURE: 60 MMHG | TEMPERATURE: 98 F | OXYGEN SATURATION: 100 % | HEART RATE: 86 BPM

## 2023-09-28 VITALS
HEART RATE: 55 BPM | RESPIRATION RATE: 18 BRPM | OXYGEN SATURATION: 100 % | TEMPERATURE: 99 F | SYSTOLIC BLOOD PRESSURE: 126 MMHG | DIASTOLIC BLOOD PRESSURE: 64 MMHG

## 2023-09-28 DIAGNOSIS — Z90.3 ACQUIRED ABSENCE OF STOMACH [PART OF]: Chronic | ICD-10-CM

## 2023-09-28 LAB
ALBUMIN SERPL ELPH-MCNC: 3.5 G/DL — SIGNIFICANT CHANGE UP (ref 3.3–5)
ALP SERPL-CCNC: 56 U/L — SIGNIFICANT CHANGE UP (ref 40–120)
ALT FLD-CCNC: <5 U/L — LOW (ref 4–33)
ANION GAP SERPL CALC-SCNC: 8 MMOL/L — SIGNIFICANT CHANGE UP (ref 7–14)
APPEARANCE UR: CLEAR — SIGNIFICANT CHANGE UP
AST SERPL-CCNC: 10 U/L — SIGNIFICANT CHANGE UP (ref 4–32)
BASOPHILS # BLD AUTO: 0.03 K/UL — SIGNIFICANT CHANGE UP (ref 0–0.2)
BASOPHILS NFR BLD AUTO: 0.4 % — SIGNIFICANT CHANGE UP (ref 0–2)
BILIRUB SERPL-MCNC: <0.2 MG/DL — SIGNIFICANT CHANGE UP (ref 0.2–1.2)
BILIRUB UR-MCNC: NEGATIVE — SIGNIFICANT CHANGE UP
BLD GP AB SCN SERPL QL: NEGATIVE — SIGNIFICANT CHANGE UP
BUN SERPL-MCNC: 8 MG/DL — SIGNIFICANT CHANGE UP (ref 7–23)
CALCIUM SERPL-MCNC: 8.3 MG/DL — LOW (ref 8.4–10.5)
CHLORIDE SERPL-SCNC: 105 MMOL/L — SIGNIFICANT CHANGE UP (ref 98–107)
CO2 SERPL-SCNC: 23 MMOL/L — SIGNIFICANT CHANGE UP (ref 22–31)
COLOR SPEC: YELLOW — SIGNIFICANT CHANGE UP
CREAT SERPL-MCNC: 0.53 MG/DL — SIGNIFICANT CHANGE UP (ref 0.5–1.3)
DIFF PNL FLD: NEGATIVE — SIGNIFICANT CHANGE UP
EGFR: 128 ML/MIN/1.73M2 — SIGNIFICANT CHANGE UP
EOSINOPHIL # BLD AUTO: 0.14 K/UL — SIGNIFICANT CHANGE UP (ref 0–0.5)
EOSINOPHIL NFR BLD AUTO: 1.7 % — SIGNIFICANT CHANGE UP (ref 0–6)
GLUCOSE SERPL-MCNC: 75 MG/DL — SIGNIFICANT CHANGE UP (ref 70–99)
GLUCOSE UR QL: NEGATIVE MG/DL — SIGNIFICANT CHANGE UP
HCG SERPL-ACNC: SIGNIFICANT CHANGE UP MIU/ML
HCG UR QL: POSITIVE
HCT VFR BLD CALC: 34 % — LOW (ref 34.5–45)
HGB BLD-MCNC: 10.2 G/DL — LOW (ref 11.5–15.5)
IANC: 5.71 K/UL — SIGNIFICANT CHANGE UP (ref 1.8–7.4)
IMM GRANULOCYTES NFR BLD AUTO: 0.2 % — SIGNIFICANT CHANGE UP (ref 0–0.9)
KETONES UR-MCNC: NEGATIVE MG/DL — SIGNIFICANT CHANGE UP
LEUKOCYTE ESTERASE UR-ACNC: NEGATIVE — SIGNIFICANT CHANGE UP
LYMPHOCYTES # BLD AUTO: 1.95 K/UL — SIGNIFICANT CHANGE UP (ref 1–3.3)
LYMPHOCYTES # BLD AUTO: 23.1 % — SIGNIFICANT CHANGE UP (ref 13–44)
MCHC RBC-ENTMCNC: 21.1 PG — LOW (ref 27–34)
MCHC RBC-ENTMCNC: 30 GM/DL — LOW (ref 32–36)
MCV RBC AUTO: 70.2 FL — LOW (ref 80–100)
MONOCYTES # BLD AUTO: 0.6 K/UL — SIGNIFICANT CHANGE UP (ref 0–0.9)
MONOCYTES NFR BLD AUTO: 7.1 % — SIGNIFICANT CHANGE UP (ref 2–14)
NEUTROPHILS # BLD AUTO: 5.71 K/UL — SIGNIFICANT CHANGE UP (ref 1.8–7.4)
NEUTROPHILS NFR BLD AUTO: 67.5 % — SIGNIFICANT CHANGE UP (ref 43–77)
NITRITE UR-MCNC: NEGATIVE — SIGNIFICANT CHANGE UP
NRBC # BLD: 0 /100 WBCS — SIGNIFICANT CHANGE UP (ref 0–0)
NRBC # FLD: 0 K/UL — SIGNIFICANT CHANGE UP (ref 0–0)
PH UR: 6.5 — SIGNIFICANT CHANGE UP (ref 5–8)
PLATELET # BLD AUTO: 289 K/UL — SIGNIFICANT CHANGE UP (ref 150–400)
POTASSIUM SERPL-MCNC: 3.7 MMOL/L — SIGNIFICANT CHANGE UP (ref 3.5–5.3)
POTASSIUM SERPL-SCNC: 3.7 MMOL/L — SIGNIFICANT CHANGE UP (ref 3.5–5.3)
PROT SERPL-MCNC: 6.5 G/DL — SIGNIFICANT CHANGE UP (ref 6–8.3)
PROT UR-MCNC: NEGATIVE MG/DL — SIGNIFICANT CHANGE UP
RBC # BLD: 4.84 M/UL — SIGNIFICANT CHANGE UP (ref 3.8–5.2)
RBC # FLD: 16.9 % — HIGH (ref 10.3–14.5)
RH IG SCN BLD-IMP: POSITIVE — SIGNIFICANT CHANGE UP
SODIUM SERPL-SCNC: 136 MMOL/L — SIGNIFICANT CHANGE UP (ref 135–145)
SP GR SPEC: 1.02 — SIGNIFICANT CHANGE UP (ref 1–1.03)
UROBILINOGEN FLD QL: 1 MG/DL — SIGNIFICANT CHANGE UP (ref 0.2–1)
WBC # BLD: 8.45 K/UL — SIGNIFICANT CHANGE UP (ref 3.8–10.5)
WBC # FLD AUTO: 8.45 K/UL — SIGNIFICANT CHANGE UP (ref 3.8–10.5)

## 2023-09-28 PROCEDURE — 99285 EMERGENCY DEPT VISIT HI MDM: CPT

## 2023-09-28 PROCEDURE — 76805 OB US >/= 14 WKS SNGL FETUS: CPT | Mod: 26

## 2023-09-28 RX ORDER — SODIUM CHLORIDE 9 MG/ML
1000 INJECTION INTRAMUSCULAR; INTRAVENOUS; SUBCUTANEOUS ONCE
Refills: 0 | Status: COMPLETED | OUTPATIENT
Start: 2023-09-28 | End: 2023-09-28

## 2023-09-28 RX ORDER — ACETAMINOPHEN 500 MG
1000 TABLET ORAL ONCE
Refills: 0 | Status: COMPLETED | OUTPATIENT
Start: 2023-09-28 | End: 2023-09-28

## 2023-09-28 RX ADMIN — Medication 400 MILLIGRAM(S): at 14:07

## 2023-09-28 RX ADMIN — SODIUM CHLORIDE 1000 MILLILITER(S): 9 INJECTION INTRAMUSCULAR; INTRAVENOUS; SUBCUTANEOUS at 14:07

## 2023-09-28 NOTE — ED PROVIDER NOTE - OBTAINED AND REVIEWED OLD RECORDS MULTI-SELECT OPTIONS
1.  Continue metformin as prescribed  2.  Encourage patient to schedule annual eye exam  3.  Check labs today   Inpatient Records

## 2023-09-28 NOTE — ED ADULT NURSE NOTE - NSFALLUNIVINTERV_ED_ALL_ED
Bed/Stretcher in lowest position, wheels locked, appropriate side rails in place/Call bell, personal items and telephone in reach/Instruct patient to call for assistance before getting out of bed/chair/stretcher/Non-slip footwear applied when patient is off stretcher/[place] to call system/Physically safe environment - no spills, clutter or unnecessary equipment/Purposeful proactive rounding/Room/bathroom lighting operational, light cord in reach

## 2023-09-28 NOTE — ED PROVIDER NOTE - PHYSICAL EXAMINATION
Dr Sims  nontoxic female. AO3. mmm  normal S1-S2  No resp distress. able to speak in full and clear sentences. no wheeze, rales or stridor.  Soft tender in the RLQ, no rebound or guarding. no cvat. not distended, not tender above umbilicus   Chaperone: Dr Torrez no cmt, right adnexal tenderness. no vag bleeding.   no pedal edema. no calf tenderness. normal pulses bilateral feet.

## 2023-09-28 NOTE — ED PROVIDER NOTE - PROGRESS NOTE DETAILS
pt stable. Updated on results: Hemoglobin 10/34 patient has no vaginal bleeding.  CMP unremarkable.  hCG noted 34,194 urine not infected Rh+ ultrasound 15 weeks 0-day single IUP with a fetal heart rate of 154 plan dc home w close OBGYN Patient still having tenderness to the right lower quadrant.  Recommend an MRI to rule out appendicitis given she is pregnant.  Patient agreeable to staying.  Has no metal in her body not claustrophobic and has no contraindications for MRI Spoke to MRI tech recommends MRI abdomen noncontrast for pregnant pt  to ro appendicitis. spoke w radiology Patient was disposed to observation unit however patient is asking to be discharged now.  States that her  when mother just passed away and she needs to get home.  Patient ANO x3 states "I know I need to stay but I cannot be here right now" explained to patient in layman terms the risk of bleeding at this time include but not limited to appendicitis, sepsis, loss of pregnancy, permanent disability and/or death.  Patient verbalized understanding and would like to sign out at this time.  Strict return precautions given.  Patient to return if any symptoms worsen or after arrangements are made for funerals.

## 2023-09-28 NOTE — ED PROVIDER NOTE - PATIENT PORTAL LINK FT
You can access the FollowMyHealth Patient Portal offered by [facility] by registering at the following website: [email or web address]. By joining [employer]’s FollowMyHealth portal, you will also be able to view your health information using other applications (apps) compatible with our system.

## 2023-09-28 NOTE — ED ADULT TRIAGE NOTE - CHIEF COMPLAINT QUOTE
Pt presents to ED with right lower abdominal pain. Pt endorsing positive home pregnancy test 3 weeks ago. LMP in June. Pt endorses spotting in July and August. Patient has not seen OBGYN denies current spotting.

## 2023-09-28 NOTE — ED CDU PROVIDER INITIAL DAY NOTE - OBJECTIVE STATEMENT
Dr Sims  32-year-old female history of PCOS presents with abdominal pain for couple days.  Pain is located in the right lower quadrant is been on and off for constant today, took a home pregnancy test it was positive to ER visit.  Does not have any vaginal bleeding.  States her periods are very irregular she had some menstrual cycle in June and spotting July and August.  Has had 2 prior pregnancies no miscarriages no terminations.  No history of STIs.  No fever no chills.  Some nausea but no vomiting.  No urinary complaints.  Past surgical history remarkable for gastric sleeve about a year and a half ago and gallbladder.  Patient not on hormone therapy.  Just a prior to arrival.  ED workup: labs wnl, US +15wks preg PT tender in the RLQ, plan CDU for MRI ro appendicitis. pt agreeable to stay.   no wbc, no lactic no fever, no abx at this time. no vag bleeding

## 2023-09-28 NOTE — ED PROVIDER NOTE - OBJECTIVE STATEMENT
Dr Sims  32-year-old female history of PCOS presents with abdominal pain for couple days.  Pain is located in the right lower quadrant is been on and off for constant today, took a home pregnancy test it was positive to ER visit.  Does not have any vaginal bleeding.  States her periods are very irregular she had some menstrual cycle in June and spotting July and August.  Has had 2 prior pregnancies no miscarriages no terminations.  No history of STIs.  No fever no chills.  Some nausea but no vomiting.  No urinary complaints.  Past surgical history remarkable for gastric sleeve about a year and a half ago and gallbladder.  Patient not on hormone therapy.  Just a prior to arrival.

## 2023-09-28 NOTE — ED PROVIDER NOTE - NSFOLLOWUPINSTRUCTIONS_ED_ALL_ED_FT
You are leaving the hospital AGAINST MEDICAL ADVICE.  You did not have an MRI of the abdomen to rule out appendicitis.  Risks of leaving include but not limited to appendicitis, sepsis, loss of pregnancy, permanent disability and death.  Please return to the emergency department if Any worsening of pain, fever, nausea, vomiting, vaginal bleeding.  Return if able to continue work-up.  Follow-up with your doctor in 1 day.    Abdominal Pain    WHAT YOU NEED TO KNOW:    Abdominal pain can be dull, achy, or sharp. You may have pain in one area of your abdomen, or in your entire abdomen. Your pain may be caused by a condition such as constipation, food sensitivity or poisoning, infection, or a blockage. Abdominal pain can also be from a hernia, appendicitis, or an ulcer. Liver, gallbladder, or kidney conditions can also cause abdominal pain. The cause of your abdominal pain may be unknown.    DISCHARGE INSTRUCTIONS:    Return to the emergency department if:    You have new chest pain or shortness of breath.    You have pulsing pain in your upper abdomen or lower back that suddenly becomes constant.    Your pain is in the right lower abdominal area and worsens with movement.    You have a fever over 100.4°F (38°C) or shaking chills.    You are vomiting and cannot keep food or liquids down.    Your pain does not improve or gets worse over the next 8 to 12 hours.    You see blood in your vomit or bowel movements, or they look black and tarry.    Your skin or the whites of your eyes turn yellow.    You are a woman and have a large amount of vaginal bleeding that is not your monthly period.  Contact your healthcare provider if:    You have pain in your lower back.    You are a man and have pain in your testicles.    You have pain when you urinate.    You have questions or concerns about your condition or care.  Follow up with your healthcare provider within 24 hours or as directed: Write down your questions so you remember to ask them during your visits.    Medicines:    Medicines may be given to calm your stomach and prevent vomiting or to decrease pain. Ask how to take pain medicine safely.    Take your medicine as directed. Contact your healthcare provider if you think your medicine is not helping or if you have side effects. Tell him of her if you are allergic to any medicine. Keep a list of the medicines, vitamins, and herbs you take. Include the amounts, and when and why you take them. Bring the list or the pill bottles to follow-up visits. Carry your medicine list with you in case of an emergency.

## 2023-12-12 ENCOUNTER — APPOINTMENT (OUTPATIENT)
Dept: OBGYN | Facility: CLINIC | Age: 33
End: 2023-12-12

## 2024-01-09 NOTE — ED PROVIDER NOTE - WR ORDER NAME 1
Pt was admitted to [facility] last year and was prescribed new medications during his visit.     Pt needs refill for atorvastatin 20 mg and amlodipine 10 mg.     Pt next OV with pcp is 1/16/24.     Please advise.    Xray Chest 1 View- PORTABLE-Urgent

## 2024-01-26 ENCOUNTER — EMERGENCY (EMERGENCY)
Facility: HOSPITAL | Age: 34
LOS: 1 days | Discharge: STILL ADMITTED OR ADMINISTRATIVE | End: 2024-01-26
Admitting: EMERGENCY MEDICINE
Payer: COMMERCIAL

## 2024-01-26 ENCOUNTER — OUTPATIENT (OUTPATIENT)
Dept: INPATIENT UNIT | Facility: HOSPITAL | Age: 34
LOS: 1 days | Discharge: HOME | End: 2024-01-26
Payer: MEDICAID

## 2024-01-26 VITALS
HEART RATE: 95 BPM | DIASTOLIC BLOOD PRESSURE: 59 MMHG | RESPIRATION RATE: 16 BRPM | TEMPERATURE: 98 F | SYSTOLIC BLOOD PRESSURE: 104 MMHG

## 2024-01-26 VITALS
SYSTOLIC BLOOD PRESSURE: 104 MMHG | OXYGEN SATURATION: 99 % | TEMPERATURE: 98 F | DIASTOLIC BLOOD PRESSURE: 65 MMHG | RESPIRATION RATE: 18 BRPM | HEART RATE: 84 BPM

## 2024-01-26 VITALS — SYSTOLIC BLOOD PRESSURE: 93 MMHG | HEART RATE: 69 BPM | DIASTOLIC BLOOD PRESSURE: 55 MMHG

## 2024-01-26 DIAGNOSIS — O26.899 OTHER SPECIFIED PREGNANCY RELATED CONDITIONS, UNSPECIFIED TRIMESTER: ICD-10-CM

## 2024-01-26 DIAGNOSIS — Z90.3 ACQUIRED ABSENCE OF STOMACH [PART OF]: Chronic | ICD-10-CM

## 2024-01-26 DIAGNOSIS — Z90.49 ACQUIRED ABSENCE OF OTHER SPECIFIED PARTS OF DIGESTIVE TRACT: Chronic | ICD-10-CM

## 2024-01-26 LAB
BASOPHILS # BLD AUTO: 0.03 K/UL — SIGNIFICANT CHANGE UP (ref 0–0.2)
BASOPHILS NFR BLD AUTO: 0.3 % — SIGNIFICANT CHANGE UP (ref 0–2)
EOSINOPHIL # BLD AUTO: 0.16 K/UL — SIGNIFICANT CHANGE UP (ref 0–0.5)
EOSINOPHIL NFR BLD AUTO: 1.4 % — SIGNIFICANT CHANGE UP (ref 0–6)
FIBRINOGEN PPP-MCNC: 500 MG/DL — HIGH (ref 200–465)
HCT VFR BLD CALC: 29.8 % — LOW (ref 34.5–45)
HGB BLD-MCNC: 8.8 G/DL — LOW (ref 11.5–15.5)
IANC: 8.66 K/UL — HIGH (ref 1.8–7.4)
IMM GRANULOCYTES NFR BLD AUTO: 1.1 % — HIGH (ref 0–0.9)
LYMPHOCYTES # BLD AUTO: 1.99 K/UL — SIGNIFICANT CHANGE UP (ref 1–3.3)
LYMPHOCYTES # BLD AUTO: 17.1 % — SIGNIFICANT CHANGE UP (ref 13–44)
MCHC RBC-ENTMCNC: 21.1 PG — LOW (ref 27–34)
MCHC RBC-ENTMCNC: 29.5 GM/DL — LOW (ref 32–36)
MCV RBC AUTO: 71.5 FL — LOW (ref 80–100)
MONOCYTES # BLD AUTO: 0.69 K/UL — SIGNIFICANT CHANGE UP (ref 0–0.9)
MONOCYTES NFR BLD AUTO: 5.9 % — SIGNIFICANT CHANGE UP (ref 2–14)
NEUTROPHILS # BLD AUTO: 8.66 K/UL — HIGH (ref 1.8–7.4)
NEUTROPHILS NFR BLD AUTO: 74.2 % — SIGNIFICANT CHANGE UP (ref 43–77)
NRBC # BLD: 0 /100 WBCS — SIGNIFICANT CHANGE UP (ref 0–0)
NRBC # FLD: 0.02 K/UL — HIGH (ref 0–0)
PLATELET # BLD AUTO: 271 K/UL — SIGNIFICANT CHANGE UP (ref 150–400)
RBC # BLD: 4.17 M/UL — SIGNIFICANT CHANGE UP (ref 3.8–5.2)
RBC # FLD: 22.6 % — HIGH (ref 10.3–14.5)
WBC # BLD: 11.66 K/UL — HIGH (ref 3.8–10.5)
WBC # FLD AUTO: 11.66 K/UL — HIGH (ref 3.8–10.5)

## 2024-01-26 PROCEDURE — 59025 FETAL NON-STRESS TEST: CPT | Mod: 26

## 2024-01-26 PROCEDURE — L9996: CPT

## 2024-01-26 PROCEDURE — 99221 1ST HOSP IP/OBS SF/LOW 40: CPT | Mod: 25

## 2024-01-26 RX ORDER — ACETAMINOPHEN 500 MG
1000 TABLET ORAL ONCE
Refills: 0 | Status: COMPLETED | OUTPATIENT
Start: 2024-01-26 | End: 2024-01-26

## 2024-01-26 RX ADMIN — Medication 1000 MILLIGRAM(S): at 20:07

## 2024-01-26 RX ADMIN — Medication 1000 MILLIGRAM(S): at 19:33

## 2024-01-26 NOTE — ED ADULT TRIAGE NOTE - CHIEF COMPLAINT QUOTE
Pt AOX4 c/o back pain  and Right leg pain s/p slip and fall down 6 wooden steps; pt is 31 weeks pregnant with 3rd child, pregnancy is considered high-risk due to gastric sleeve surgery in  and severe anemia, pt is due for a transfusion of Fe this afternoon in OBs office; pregnancy has been normal so far, pt feels normal fetal movement since the fall, denies any vaginal bleeding  OB Dr. Jennifer Handley  G3 T2  L2 Pt AOX4 c/o back pain  and Right leg pain s/p slip and fall down 6 wooden steps; pt is 31 weeks pregnant with 3rd child, pregnancy is considered high-risk due to gastric sleeve surgery in  and severe anemia, pt is due for a transfusion of Fe this afternoon in OBs office; pregnancy has been normal so far, pt feels normal fetal movement since the fall, denies any vaginal bleeding  OB Dr. Jennifer Handley  G3 T2  L2    L&D called back after  huddle called & patient to go to L&D per Dr. Craven. Pt AOX4 c/o back pain  and Right leg pain s/p slip and fall down 6 wooden steps; pt is 31 weeks pregnant with 3rd child, pregnancy is considered high-risk due to gastric sleeve surgery in  and severe anemia, pt is due for a transfusion of Fe this afternoon in OB's office; pregnancy has been normal so far, pt feels normal fetal movement since the fall, denies any vaginal bleeding  OB Dr. Jennifer Handley  G3 T2  L2   huddle called at 16:42, ANM notified, Charge RN notified   L&D called back after  huddle called & patient to go to L&D per Dr. Craven.

## 2024-01-26 NOTE — OB PROVIDER TRIAGE NOTE - NSHPPHYSICALEXAM_GEN_ALL_CORE
Assessment reveals VSS, abdomen soft, NT, gravid.   BPP 8/8, MAKEDA 11.2, vtx, posterior placenta, saved in asob  Blood type AB pos    For 4 hour monitoring  CBC and Fibrinogen sent

## 2024-01-26 NOTE — OB PROVIDER TRIAGE NOTE - HISTORY OF PRESENT ILLNESS
32yo  @ 31.1 presents for evaluation following a fall. Reports she fell down 6 stairs on her behind at 1400. Denies abdominal trauma, Since fall denies LOF, VB, pain and reports GFM.      Gastric Sleeve   Cholecystectomy  PCOS

## 2024-01-26 NOTE — OB PROVIDER TRIAGE NOTE - ADDITIONAL INSTRUCTIONS
Follow up at next scheduled prenatal appointment Feb 3rd  Return for decreased fetal movement, loss of fluid or vaginal bleeding  Increase oral hydration  Signs and Symptoms of Labor reviewed   Kick Counts Reviewed   Tylenol as needed for lower back pain as directed

## 2024-01-26 NOTE — OB RN TRIAGE NOTE - FALL HARM RISK - RISK INTERVENTIONS

## 2024-01-26 NOTE — OB PROVIDER TRIAGE NOTE - NSOBPROVIDERNOTE_OBGYN_ALL_OB_FT
19:15 DOMINICK Velazquez NP  Received care of patient  NST Continues  21:30  Cat 1 tracing, reactive  Denies abdominal pain or contractions  Reports lower back/butt pain from fall, states some relief from Tylenol given

## 2024-01-26 NOTE — ED ADULT NURSE NOTE - CHIEF COMPLAINT QUOTE
Pt AOX4 c/o back pain  and Right leg pain s/p slip and fall down 6 wooden steps; pt is 31 weeks pregnant with 3rd child, pregnancy is considered high-risk due to gastric sleeve surgery in  and severe anemia, pt is due for a transfusion of Fe this afternoon in OB's office; pregnancy has been normal so far, pt feels normal fetal movement since the fall, denies any vaginal bleeding  OB Dr. Jennifer Handley  G3 T2  L2   huddle called at 16:42, ANM notified, Charge RN notified   L&D called back after  huddle called & patient to go to L&D per Dr. Craven.

## 2024-01-26 NOTE — OB PROVIDER TRIAGE NOTE - NS_OBGYNHISTORY_OBGYN_ALL_OB_FT
C/S X2 2014 arrest of descent/dilation 7#             2016 repeat c/s    PCOS    AP course complicated by:  Severe anemia- Iron infusions x 3  Unable to complete Glucose test due to vomiting glucola

## 2024-01-26 NOTE — OB RN TRIAGE NOTE - NS_GESTAGE_OBGYN_ALL_OB_FT
Patient is on long term anticoagulation for A Fib and is not therapeutic for goal of 2.0-3.0 (INR 3.1 today). Patient to change dose as follows: 3mg tues and fri, 2mg rest of the days. Will recheck INR in 2 weeks. INR directions provided by Dr. Najera. INR reviewed by Dr. Najera. Script sent to [facility] pharmacy for 2mg tablets. [facility] also [facility]. Pt received verbal and written instructions.    31w1d

## 2024-01-26 NOTE — CHART NOTE - NSCHARTNOTEFT_GEN_A_CORE
R4 Chart Note     Alerted in regards to  huddle to Intake 9 . Patient at this time in the triage area awaiting room in Intake. Patient briefly a 31 weeker who fell down a flight of stairs. Charge nurse Kenia aware to send the patient straight to L&D triage. Triage & charge nurse aware.     plan discussed with safety officers  Dr. Casper Eduardo and Dr. Mellissa Craven, PGY4

## 2024-01-26 NOTE — OB PROVIDER TRIAGE NOTE - PLAN OF CARE
D/C Home  D/W Dr. Thomas and Dr. Batres  No evidence of acute process  Normal fetal testing  No c/o abdominal pain or contractions  Follow up at next scheduled prenatal appointment Feb 3rd  Return for decreased fetal movement, loss of fluid or vaginal bleeding  Increase oral hydration  Signs and Symptoms of Labor reviewed   Kick Counts Reviewed   Tylenol as needed for lower back pain as directed  D/C 21:45

## 2024-01-29 DIAGNOSIS — O99.283 ENDOCRINE, NUTRITIONAL AND METABOLIC DISEASES COMPLICATING PREGNANCY, THIRD TRIMESTER: ICD-10-CM
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
## 2024-01-29 DIAGNOSIS — O34.211 MATERNAL CARE FOR LOW TRANSVERSE SCAR FROM PREVIOUS CESAREAN DELIVERY: ICD-10-CM

## 2024-01-29 DIAGNOSIS — Z3A.31 31 WEEKS GESTATION OF PREGNANCY: ICD-10-CM

## 2024-01-29 DIAGNOSIS — O99.013 ANEMIA COMPLICATING PREGNANCY, THIRD TRIMESTER: ICD-10-CM

## 2024-01-29 DIAGNOSIS — O99.891 OTHER SPECIFIED DISEASES AND CONDITIONS COMPLICATING PREGNANCY: ICD-10-CM

## 2024-01-29 DIAGNOSIS — Z87.59 PERSONAL HISTORY OF OTHER COMPLICATIONS OF PREGNANCY, CHILDBIRTH AND THE PUERPERIUM: ICD-10-CM

## 2024-01-29 DIAGNOSIS — D64.9 ANEMIA, UNSPECIFIED: ICD-10-CM

## 2024-01-29 DIAGNOSIS — E28.2 POLYCYSTIC OVARIAN SYNDROME: ICD-10-CM

## 2024-01-29 DIAGNOSIS — M54.50 LOW BACK PAIN, UNSPECIFIED: ICD-10-CM
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
## 2024-01-29 DIAGNOSIS — O99.843 BARIATRIC SURGERY STATUS COMPLICATING PREGNANCY, THIRD TRIMESTER: ICD-10-CM

## 2024-02-08 ENCOUNTER — ASOB RESULT (OUTPATIENT)
Age: 34
End: 2024-02-08

## 2024-02-08 ENCOUNTER — NON-APPOINTMENT (OUTPATIENT)
Age: 34
End: 2024-02-08

## 2024-02-08 ENCOUNTER — APPOINTMENT (OUTPATIENT)
Dept: ANTEPARTUM | Facility: CLINIC | Age: 34
End: 2024-02-08
Payer: MEDICAID

## 2024-02-08 DIAGNOSIS — O99.810 ABNORMAL GLUCOSE COMPLICATING PREGNANCY: ICD-10-CM

## 2024-02-08 PROCEDURE — 76819 FETAL BIOPHYS PROFIL W/O NST: CPT

## 2024-02-08 PROCEDURE — 76805 OB US >/= 14 WKS SNGL FETUS: CPT

## 2024-02-12 RX ORDER — LANCETS 33 GAUGE
EACH MISCELLANEOUS
Qty: 4 | Refills: 0 | Status: ACTIVE | COMMUNITY
Start: 2024-02-08 | End: 1900-01-01

## 2024-02-12 RX ORDER — BLOOD-GLUCOSE METER
W/DEVICE KIT MISCELLANEOUS
Qty: 1 | Refills: 0 | Status: ACTIVE | COMMUNITY
Start: 2024-02-08 | End: 1900-01-01

## 2024-02-12 RX ORDER — BLOOD-GLUCOSE METER
KIT MISCELLANEOUS 4 TIMES DAILY
Qty: 2 | Refills: 2 | Status: ACTIVE | COMMUNITY
Start: 2024-02-08 | End: 1900-01-01

## 2024-02-16 ENCOUNTER — APPOINTMENT (OUTPATIENT)
Dept: MATERNAL FETAL MEDICINE | Facility: CLINIC | Age: 34
End: 2024-02-16
Payer: MEDICAID

## 2024-02-16 ENCOUNTER — ASOB RESULT (OUTPATIENT)
Age: 34
End: 2024-02-16

## 2024-02-16 PROCEDURE — G0108 DIAB MANAGE TRN  PER INDIV: CPT | Mod: 95

## 2024-02-23 ENCOUNTER — APPOINTMENT (OUTPATIENT)
Dept: MATERNAL FETAL MEDICINE | Facility: CLINIC | Age: 34
End: 2024-02-23
Payer: MEDICAID

## 2024-02-23 ENCOUNTER — ASOB RESULT (OUTPATIENT)
Age: 34
End: 2024-02-23

## 2024-02-23 PROCEDURE — G0108 DIAB MANAGE TRN  PER INDIV: CPT | Mod: 95

## 2024-02-29 ENCOUNTER — ASOB RESULT (OUTPATIENT)
Age: 34
End: 2024-02-29

## 2024-02-29 ENCOUNTER — APPOINTMENT (OUTPATIENT)
Dept: ANTEPARTUM | Facility: CLINIC | Age: 34
End: 2024-02-29
Payer: MEDICAID

## 2024-02-29 PROCEDURE — 76819 FETAL BIOPHYS PROFIL W/O NST: CPT

## 2024-02-29 PROCEDURE — 76820 UMBILICAL ARTERY ECHO: CPT | Mod: 59

## 2024-02-29 PROCEDURE — 76816 OB US FOLLOW-UP PER FETUS: CPT

## 2024-03-16 ENCOUNTER — TRANSCRIPTION ENCOUNTER (OUTPATIENT)
Age: 34
End: 2024-03-16

## 2024-03-17 ENCOUNTER — EMERGENCY (EMERGENCY)
Facility: HOSPITAL | Age: 34
LOS: 1 days | Discharge: STILL ADMITTED OR ADMINISTRATIVE | End: 2024-03-17
Admitting: EMERGENCY MEDICINE
Payer: COMMERCIAL

## 2024-03-17 ENCOUNTER — INPATIENT (INPATIENT)
Facility: HOSPITAL | Age: 34
LOS: 2 days | Discharge: HOME | End: 2024-03-20
Attending: OBSTETRICS & GYNECOLOGY | Admitting: OBSTETRICS & GYNECOLOGY
Payer: MEDICAID

## 2024-03-17 VITALS — DIASTOLIC BLOOD PRESSURE: 62 MMHG | HEART RATE: 60 BPM | SYSTOLIC BLOOD PRESSURE: 105 MMHG

## 2024-03-17 VITALS
OXYGEN SATURATION: 100 % | HEART RATE: 57 BPM | DIASTOLIC BLOOD PRESSURE: 72 MMHG | RESPIRATION RATE: 18 BRPM | TEMPERATURE: 98 F | SYSTOLIC BLOOD PRESSURE: 118 MMHG

## 2024-03-17 DIAGNOSIS — Z90.49 ACQUIRED ABSENCE OF OTHER SPECIFIED PARTS OF DIGESTIVE TRACT: Chronic | ICD-10-CM

## 2024-03-17 DIAGNOSIS — O26.899 OTHER SPECIFIED PREGNANCY RELATED CONDITIONS, UNSPECIFIED TRIMESTER: ICD-10-CM

## 2024-03-17 DIAGNOSIS — O42.90 PREMATURE RUPTURE OF MEMBRANES, UNSPECIFIED AS TO LENGTH OF TIME BETWEEN RUPTURE AND ONSET OF LABOR, UNSPECIFIED WEEKS OF GESTATION: ICD-10-CM

## 2024-03-17 DIAGNOSIS — O42.10 PREMATURE RUPTURE OF MEMBRANES, ONSET OF LABOR MORE THAN 24 HOURS FOLLOWING RUPTURE, UNSPECIFIED WEEKS OF GESTATION: ICD-10-CM

## 2024-03-17 DIAGNOSIS — Z90.3 ACQUIRED ABSENCE OF STOMACH [PART OF]: Chronic | ICD-10-CM

## 2024-03-17 LAB
APTT BLD: 27.3 SEC — SIGNIFICANT CHANGE UP (ref 24.5–35.6)
BASOPHILS # BLD AUTO: 0.03 K/UL — SIGNIFICANT CHANGE UP (ref 0–0.2)
BASOPHILS NFR BLD AUTO: 0.3 % — SIGNIFICANT CHANGE UP (ref 0–2)
BLD GP AB SCN SERPL QL: NEGATIVE — SIGNIFICANT CHANGE UP
EOSINOPHIL # BLD AUTO: 0.13 K/UL — SIGNIFICANT CHANGE UP (ref 0–0.5)
EOSINOPHIL NFR BLD AUTO: 1.4 % — SIGNIFICANT CHANGE UP (ref 0–6)
FIBRINOGEN PPP-MCNC: 359 MG/DL — SIGNIFICANT CHANGE UP (ref 200–465)
HCT VFR BLD CALC: 35.7 % — SIGNIFICANT CHANGE UP (ref 34.5–45)
HCT VFR BLD CALC: 36.1 % — SIGNIFICANT CHANGE UP (ref 34.5–45)
HCT VFR BLD CALC: 38.4 % — SIGNIFICANT CHANGE UP (ref 34.5–45)
HCT VFR BLD CALC: 41.1 % — SIGNIFICANT CHANGE UP (ref 34.5–45)
HGB BLD-MCNC: 11.6 G/DL — SIGNIFICANT CHANGE UP (ref 11.5–15.5)
HGB BLD-MCNC: 11.9 G/DL — SIGNIFICANT CHANGE UP (ref 11.5–15.5)
HGB BLD-MCNC: 12.7 G/DL — SIGNIFICANT CHANGE UP (ref 11.5–15.5)
HGB BLD-MCNC: 13.4 G/DL — SIGNIFICANT CHANGE UP (ref 11.5–15.5)
IANC: 6.75 K/UL — SIGNIFICANT CHANGE UP (ref 1.8–7.4)
IMM GRANULOCYTES NFR BLD AUTO: 0.8 % — SIGNIFICANT CHANGE UP (ref 0–0.9)
INR BLD: 0.94 RATIO — SIGNIFICANT CHANGE UP (ref 0.85–1.18)
LACTATE SERPL-SCNC: 2.6 MMOL/L — HIGH (ref 0.5–2)
LYMPHOCYTES # BLD AUTO: 1.89 K/UL — SIGNIFICANT CHANGE UP (ref 1–3.3)
LYMPHOCYTES # BLD AUTO: 20.3 % — SIGNIFICANT CHANGE UP (ref 13–44)
MCHC RBC-ENTMCNC: 26.5 PG — LOW (ref 27–34)
MCHC RBC-ENTMCNC: 26.9 PG — LOW (ref 27–34)
MCHC RBC-ENTMCNC: 26.9 PG — LOW (ref 27–34)
MCHC RBC-ENTMCNC: 27.2 PG — SIGNIFICANT CHANGE UP (ref 27–34)
MCHC RBC-ENTMCNC: 32.5 GM/DL — SIGNIFICANT CHANGE UP (ref 32–36)
MCHC RBC-ENTMCNC: 32.6 GM/DL — SIGNIFICANT CHANGE UP (ref 32–36)
MCHC RBC-ENTMCNC: 33 GM/DL — SIGNIFICANT CHANGE UP (ref 32–36)
MCHC RBC-ENTMCNC: 33.1 GM/DL — SIGNIFICANT CHANGE UP (ref 32–36)
MCV RBC AUTO: 81.4 FL — SIGNIFICANT CHANGE UP (ref 80–100)
MCV RBC AUTO: 81.5 FL — SIGNIFICANT CHANGE UP (ref 80–100)
MCV RBC AUTO: 82.2 FL — SIGNIFICANT CHANGE UP (ref 80–100)
MCV RBC AUTO: 82.8 FL — SIGNIFICANT CHANGE UP (ref 80–100)
MONOCYTES # BLD AUTO: 0.45 K/UL — SIGNIFICANT CHANGE UP (ref 0–0.9)
MONOCYTES NFR BLD AUTO: 4.8 % — SIGNIFICANT CHANGE UP (ref 2–14)
NEUTROPHILS # BLD AUTO: 6.75 K/UL — SIGNIFICANT CHANGE UP (ref 1.8–7.4)
NEUTROPHILS NFR BLD AUTO: 72.4 % — SIGNIFICANT CHANGE UP (ref 43–77)
NRBC # BLD: 0 /100 WBCS — SIGNIFICANT CHANGE UP (ref 0–0)
NRBC # FLD: 0 K/UL — SIGNIFICANT CHANGE UP (ref 0–0)
PLATELET # BLD AUTO: 172 K/UL — SIGNIFICANT CHANGE UP (ref 150–400)
PLATELET # BLD AUTO: 182 K/UL — SIGNIFICANT CHANGE UP (ref 150–400)
PLATELET # BLD AUTO: 185 K/UL — SIGNIFICANT CHANGE UP (ref 150–400)
PLATELET # BLD AUTO: 194 K/UL — SIGNIFICANT CHANGE UP (ref 150–400)
PROTHROM AB SERPL-ACNC: 10.6 SEC — SIGNIFICANT CHANGE UP (ref 9.5–13)
RBC # BLD: 4.31 M/UL — SIGNIFICANT CHANGE UP (ref 3.8–5.2)
RBC # BLD: 4.43 M/UL — SIGNIFICANT CHANGE UP (ref 3.8–5.2)
RBC # BLD: 4.67 M/UL — SIGNIFICANT CHANGE UP (ref 3.8–5.2)
RBC # BLD: 5.05 M/UL — SIGNIFICANT CHANGE UP (ref 3.8–5.2)
RBC # FLD: SIGNIFICANT CHANGE UP (ref 10.3–14.5)
RH IG SCN BLD-IMP: POSITIVE — SIGNIFICANT CHANGE UP
T PALLIDUM AB TITR SER: NEGATIVE — SIGNIFICANT CHANGE UP
WBC # BLD: 17.2 K/UL — HIGH (ref 3.8–10.5)
WBC # BLD: 17.75 K/UL — HIGH (ref 3.8–10.5)
WBC # BLD: 18.4 K/UL — HIGH (ref 3.8–10.5)
WBC # BLD: 9.32 K/UL — SIGNIFICANT CHANGE UP (ref 3.8–10.5)
WBC # FLD AUTO: 17.2 K/UL — HIGH (ref 3.8–10.5)
WBC # FLD AUTO: 17.75 K/UL — HIGH (ref 3.8–10.5)
WBC # FLD AUTO: 18.4 K/UL — HIGH (ref 3.8–10.5)
WBC # FLD AUTO: 9.32 K/UL — SIGNIFICANT CHANGE UP (ref 3.8–10.5)

## 2024-03-17 PROCEDURE — 93010 ELECTROCARDIOGRAM REPORT: CPT

## 2024-03-17 PROCEDURE — 88302 TISSUE EXAM BY PATHOLOGIST: CPT | Mod: 26

## 2024-03-17 PROCEDURE — L9996: CPT

## 2024-03-17 DEVICE — SURGICEL SNOW 2 X 4": Type: IMPLANTABLE DEVICE | Status: FUNCTIONAL

## 2024-03-17 RX ORDER — SODIUM CHLORIDE 9 MG/ML
1000 INJECTION, SOLUTION INTRAVENOUS
Refills: 0 | Status: DISCONTINUED | OUTPATIENT
Start: 2024-03-17 | End: 2024-03-18

## 2024-03-17 RX ORDER — INFLUENZA VIRUS VACCINE 15; 15; 15; 15 UG/.5ML; UG/.5ML; UG/.5ML; UG/.5ML
0.5 SUSPENSION INTRAMUSCULAR ONCE
Refills: 0 | Status: DISCONTINUED | OUTPATIENT
Start: 2024-03-17 | End: 2024-03-20

## 2024-03-17 RX ORDER — NALOXONE HYDROCHLORIDE 4 MG/.1ML
0.1 SPRAY NASAL
Refills: 0 | Status: DISCONTINUED | OUTPATIENT
Start: 2024-03-17 | End: 2024-03-17

## 2024-03-17 RX ORDER — NALOXONE HYDROCHLORIDE 4 MG/.1ML
0.1 SPRAY NASAL
Refills: 0 | Status: DISCONTINUED | OUTPATIENT
Start: 2024-03-17 | End: 2024-03-18

## 2024-03-17 RX ORDER — OXYTOCIN 10 UNIT/ML
333.33 VIAL (ML) INJECTION
Qty: 20 | Refills: 0 | Status: DISCONTINUED | OUTPATIENT
Start: 2024-03-17 | End: 2024-03-18

## 2024-03-17 RX ORDER — SIMETHICONE 80 MG/1
80 TABLET, CHEWABLE ORAL EVERY 4 HOURS
Refills: 0 | Status: DISCONTINUED | OUTPATIENT
Start: 2024-03-17 | End: 2024-03-20

## 2024-03-17 RX ORDER — TETANUS TOXOID, REDUCED DIPHTHERIA TOXOID AND ACELLULAR PERTUSSIS VACCINE, ADSORBED 5; 2.5; 8; 8; 2.5 [IU]/.5ML; [IU]/.5ML; UG/.5ML; UG/.5ML; UG/.5ML
0.5 SUSPENSION INTRAMUSCULAR ONCE
Refills: 0 | Status: DISCONTINUED | OUTPATIENT
Start: 2024-03-17 | End: 2024-03-20

## 2024-03-17 RX ORDER — OXYTOCIN 10 UNIT/ML
333.33 VIAL (ML) INJECTION
Qty: 20 | Refills: 0 | Status: DISCONTINUED | OUTPATIENT
Start: 2024-03-17 | End: 2024-03-17

## 2024-03-17 RX ORDER — MORPHINE SULFATE 50 MG/1
0.1 CAPSULE, EXTENDED RELEASE ORAL ONCE
Refills: 0 | Status: DISCONTINUED | OUTPATIENT
Start: 2024-03-17 | End: 2024-03-18

## 2024-03-17 RX ORDER — MAGNESIUM HYDROXIDE 400 MG/1
30 TABLET, CHEWABLE ORAL
Refills: 0 | Status: DISCONTINUED | OUTPATIENT
Start: 2024-03-17 | End: 2024-03-20

## 2024-03-17 RX ORDER — SODIUM CHLORIDE 9 MG/ML
1000 INJECTION, SOLUTION INTRAVENOUS ONCE
Refills: 0 | Status: COMPLETED | OUTPATIENT
Start: 2024-03-17 | End: 2024-03-17

## 2024-03-17 RX ORDER — NALBUPHINE HYDROCHLORIDE 10 MG/ML
2.5 INJECTION, SOLUTION INTRAMUSCULAR; INTRAVENOUS; SUBCUTANEOUS EVERY 6 HOURS
Refills: 0 | Status: DISCONTINUED | OUTPATIENT
Start: 2024-03-17 | End: 2024-03-18

## 2024-03-17 RX ORDER — SODIUM CHLORIDE 9 MG/ML
1000 INJECTION, SOLUTION INTRAVENOUS
Refills: 0 | Status: DISCONTINUED | OUTPATIENT
Start: 2024-03-17 | End: 2024-03-17

## 2024-03-17 RX ORDER — MORPHINE SULFATE 50 MG/1
0.1 CAPSULE, EXTENDED RELEASE ORAL ONCE
Refills: 0 | Status: DISCONTINUED | OUTPATIENT
Start: 2024-03-17 | End: 2024-03-17

## 2024-03-17 RX ORDER — HEPARIN SODIUM 5000 [USP'U]/ML
5000 INJECTION INTRAVENOUS; SUBCUTANEOUS EVERY 12 HOURS
Refills: 0 | Status: DISCONTINUED | OUTPATIENT
Start: 2024-03-17 | End: 2024-03-20

## 2024-03-17 RX ORDER — OXYCODONE HYDROCHLORIDE 5 MG/1
5 TABLET ORAL
Refills: 0 | Status: COMPLETED | OUTPATIENT
Start: 2024-03-17 | End: 2024-03-24

## 2024-03-17 RX ORDER — ONDANSETRON 8 MG/1
4 TABLET, FILM COATED ORAL EVERY 6 HOURS
Refills: 0 | Status: DISCONTINUED | OUTPATIENT
Start: 2024-03-17 | End: 2024-03-17

## 2024-03-17 RX ORDER — CHLORHEXIDINE GLUCONATE 213 G/1000ML
1 SOLUTION TOPICAL DAILY
Refills: 0 | Status: DISCONTINUED | OUTPATIENT
Start: 2024-03-17 | End: 2024-03-17

## 2024-03-17 RX ORDER — DIPHENHYDRAMINE HCL 50 MG
25 CAPSULE ORAL EVERY 6 HOURS
Refills: 0 | Status: DISCONTINUED | OUTPATIENT
Start: 2024-03-17 | End: 2024-03-20

## 2024-03-17 RX ORDER — ACETAMINOPHEN 500 MG
1000 TABLET ORAL ONCE
Refills: 0 | Status: COMPLETED | OUTPATIENT
Start: 2024-03-17 | End: 2024-03-17

## 2024-03-17 RX ORDER — NALBUPHINE HYDROCHLORIDE 10 MG/ML
2.5 INJECTION, SOLUTION INTRAMUSCULAR; INTRAVENOUS; SUBCUTANEOUS EVERY 6 HOURS
Refills: 0 | Status: DISCONTINUED | OUTPATIENT
Start: 2024-03-17 | End: 2024-03-17

## 2024-03-17 RX ORDER — OXYCODONE HYDROCHLORIDE 5 MG/1
5 TABLET ORAL
Refills: 0 | Status: DISCONTINUED | OUTPATIENT
Start: 2024-03-17 | End: 2024-03-17

## 2024-03-17 RX ORDER — OXYCODONE HYDROCHLORIDE 5 MG/1
5 TABLET ORAL ONCE
Refills: 0 | Status: DISCONTINUED | OUTPATIENT
Start: 2024-03-17 | End: 2024-03-20

## 2024-03-17 RX ORDER — ONDANSETRON 8 MG/1
4 TABLET, FILM COATED ORAL EVERY 6 HOURS
Refills: 0 | Status: DISCONTINUED | OUTPATIENT
Start: 2024-03-17 | End: 2024-03-18

## 2024-03-17 RX ORDER — DEXAMETHASONE 0.5 MG/5ML
4 ELIXIR ORAL EVERY 6 HOURS
Refills: 0 | Status: DISCONTINUED | OUTPATIENT
Start: 2024-03-17 | End: 2024-03-18

## 2024-03-17 RX ORDER — OXYCODONE HYDROCHLORIDE 5 MG/1
5 TABLET ORAL
Refills: 0 | Status: DISCONTINUED | OUTPATIENT
Start: 2024-03-17 | End: 2024-03-20

## 2024-03-17 RX ORDER — IBUPROFEN 200 MG
600 TABLET ORAL EVERY 6 HOURS
Refills: 0 | Status: DISCONTINUED | OUTPATIENT
Start: 2024-03-17 | End: 2024-03-20

## 2024-03-17 RX ORDER — FAMOTIDINE 10 MG/ML
20 INJECTION INTRAVENOUS ONCE
Refills: 0 | Status: COMPLETED | OUTPATIENT
Start: 2024-03-17 | End: 2024-03-17

## 2024-03-17 RX ORDER — CITRIC ACID/SODIUM CITRATE 300-500 MG
30 SOLUTION, ORAL ORAL ONCE
Refills: 0 | Status: COMPLETED | OUTPATIENT
Start: 2024-03-17 | End: 2024-03-17

## 2024-03-17 RX ORDER — DEXAMETHASONE 0.5 MG/5ML
4 ELIXIR ORAL EVERY 6 HOURS
Refills: 0 | Status: DISCONTINUED | OUTPATIENT
Start: 2024-03-17 | End: 2024-03-17

## 2024-03-17 RX ORDER — KETOROLAC TROMETHAMINE 30 MG/ML
30 SYRINGE (ML) INJECTION EVERY 6 HOURS
Refills: 0 | Status: DISCONTINUED | OUTPATIENT
Start: 2024-03-17 | End: 2024-03-18

## 2024-03-17 RX ORDER — ACETAMINOPHEN 500 MG
975 TABLET ORAL
Refills: 0 | Status: DISCONTINUED | OUTPATIENT
Start: 2024-03-17 | End: 2024-03-18

## 2024-03-17 RX ORDER — LANOLIN
1 OINTMENT (GRAM) TOPICAL EVERY 6 HOURS
Refills: 0 | Status: DISCONTINUED | OUTPATIENT
Start: 2024-03-17 | End: 2024-03-20

## 2024-03-17 RX ADMIN — SODIUM CHLORIDE 125 MILLILITER(S): 9 INJECTION, SOLUTION INTRAVENOUS at 15:17

## 2024-03-17 RX ADMIN — SIMETHICONE 80 MILLIGRAM(S): 80 TABLET, CHEWABLE ORAL at 21:19

## 2024-03-17 RX ADMIN — Medication 975 MILLIGRAM(S): at 21:50

## 2024-03-17 RX ADMIN — FAMOTIDINE 20 MILLIGRAM(S): 10 INJECTION INTRAVENOUS at 07:33

## 2024-03-17 RX ADMIN — Medication 1000 MILLIUNIT(S)/MIN: at 15:17

## 2024-03-17 RX ADMIN — OXYCODONE HYDROCHLORIDE 5 MILLIGRAM(S): 5 TABLET ORAL at 15:22

## 2024-03-17 RX ADMIN — Medication 975 MILLIGRAM(S): at 21:19

## 2024-03-17 RX ADMIN — SODIUM CHLORIDE 2000 MILLILITER(S): 9 INJECTION, SOLUTION INTRAVENOUS at 07:33

## 2024-03-17 RX ADMIN — HEPARIN SODIUM 5000 UNIT(S): 5000 INJECTION INTRAVENOUS; SUBCUTANEOUS at 17:47

## 2024-03-17 RX ADMIN — Medication 30 MILLILITER(S): at 07:33

## 2024-03-17 RX ADMIN — Medication 400 MILLIGRAM(S): at 14:56

## 2024-03-17 RX ADMIN — SODIUM CHLORIDE 1000 MILLILITER(S): 9 INJECTION, SOLUTION INTRAVENOUS at 21:20

## 2024-03-17 RX ADMIN — OXYCODONE HYDROCHLORIDE 5 MILLIGRAM(S): 5 TABLET ORAL at 16:30

## 2024-03-17 RX ADMIN — Medication 1000 MILLIGRAM(S): at 15:18

## 2024-03-17 NOTE — OB RN DELIVERY SUMMARY - NS_SEPSISRSKCALC_OBGYN_ALL_OB_FT
EOS calculated successfully. EOS Risk Factor: 0.5/1000 live births ([facility] national incidence); GA=38w3d; Temp=99; ROM=4.933; GBS='Negative'; Antibiotics='No antibiotics or any antibiotics < 2 hrs prior to birth'

## 2024-03-17 NOTE — OB RN TRIAGE NOTE - FALL HARM RISK - RISK INTERVENTIONS
23-Dec-2022 11:33 23-Dec-2022 19:40 24-Dec-2022 12:06 Communicate Fall Risk and Risk Factors to all staff, patient, and family/Reinforce activity limits and safety measures with patient and family/Visual Cue: Yellow wristband/Bed in lowest position, wheels locked, appropriate side rails in place/Call bell, personal items and telephone in reach/Instruct patient to call for assistance before getting out of bed or chair/Non-slip footwear when patient is out of bed/[place] to call system/Physically safe environment - no spills, clutter or unnecessary equipment/Purposeful Proactive Rounding/Room/bathroom lighting operational, light cord in reach

## 2024-03-17 NOTE — OB RN TRIAGE NOTE - NS_TRIAGEPROVIDERNOTIFIEDBY_OBGYN_ALL_OB_FT
Anesthesia Volume In Cc: 0 Post-Procedure Care (Optional): The patient received detailed post-op instructions. Bacitracin ointment was applied after the procedure. Advised patient to apply topical antibiotic ointment twice daily for 7 days. Consent: The patient's consent was obtained including but not limited to risks of crusting, scabbing, blistering, scarring, darker or lighter pigmentary change, recurrence, incomplete removal and infection. Render Note In Bullet Format When Appropriate: No Method: electrodesiccation Detail Level: Simple Post-Care Instructions: I reviewed with the patient in detail post-care instructions. Patient is to wear sunprotection, and avoid picking at any of the treated lesions. Pt may apply Vaseline to crusted or scabbing areas. Eloisa Cervantes

## 2024-03-17 NOTE — OB NEONATOLOGY/PEDIATRICIAN DELIVERY SUMMARY - NSPEDSNEONOTESA_OBGYN_ALL_OB_FT
Pediatrician called to delivery for possible vacuum, not used. Arrived after patient born, examined. Female infant born at 38+3/7 wks via rC-section to a 32 y/o  blood type AB+ mother. Maternal history of  Anemia (IV iron last 1 month ago), PCOS, Gastric sleeve and cholecystectomy in , and Anxiety w/ therapy. No significant prenatal history. Prenatal labs nr/immune/-, GBS - on . SROM at 0500 on 3/17 with clear/meconium fluids. EOS score 0.17, highest maternal temperature 99. Baby emerged vigorous, crying. Infant was brought to radiant warmer and warmed, dried, stimulated and suctioned. HR>100, normal respiratory effort. Stool x1. APGARS of 9/9. Mom is initiating breast feeding. Consents to Hepatitis B vaccination. Pediatrician is Dr. Mock.     BW: 2910g (36%ile)  : 3/17/24  TOB: 0956 Pediatrician called to delivery for possible vacuum, not used. Arrived after patient born, examined. Female infant born at 38+3/7 wks via rC-section to a 30 y/o  blood type AB+ mother. Maternal history of  Anemia (IV iron last 1 month ago), PCOS, Gastric sleeve and cholecystectomy in , and Anxiety w/ therapy. No significant prenatal history. Prenatal labs nr/immune/-, GBS - on . SROM at 0500 on 3/17 with clear/meconium fluids. EOS score 0.17, highest maternal temperature 99. Baby emerged vigorous, crying. Infant was brought to radiant warmer and warmed, dried, stimulated and suctioned. HR>100, normal respiratory effort. Stool x1. APGARS of 9/9. Mom is initiating breast feeding. Consents to Hepatitis B vaccination. Pediatrician is Dr. Mock.     BW: 2910g (36%ile)  : 3/17/24  TOB: 0956    Physical Exam:  Gen: no acute distress, +grimace  HEENT:  anterior fontanel open soft and flat, nondysmorphic facies, no cleft lip/palate, ears normal set, no ear pits or tags, nares clinically patent  Resp: Normal respiratory effort without grunting or retractions, good air entry b/l, clear to auscultation bilaterally  Cardio: Present S1/S2, regular rate and rhythm, no murmurs  Abd: soft, non tender, non distended, umbilical cord with 3 vessels  Neuro: +palmar and plantar grasp, +suck, +adriana, normal tone  Extremities: negative ocampo and ortolani maneuvers, moving all extremities, no clavicular crepitus or stepoff  Skin: pink, warm  Genitals: Normal female anatomy, Sam 1, anus patent

## 2024-03-17 NOTE — OB RN PATIENT PROFILE - NSLDARRIVAL_OBGYN_ALL_OB_START_DATE
Please use PULMITPINITIAL SmartPhrase to enter your Outcome Assessment for this patient.        17-Mar-2024 05:54

## 2024-03-17 NOTE — OB RN INTRAOPERATIVE NOTE - NS_ROOMIN_OBGYN_ALL_OB_DT
Report received from Shonna Montelongo [place]. Patient care assumed at this time.
17-Mar-2024 08:50

## 2024-03-17 NOTE — OB PROVIDER DELIVERY SUMMARY - NSPROVIDERDELIVERYNOTE_OBGYN_ALL_OB_FT
MTCS + BS after presenting w/ after PROM @ 38w3d    Viable male infant. Cephalic presentation. 2910g . APGARS 9/9  Omental adhesions to the anterior uterus and peritoneum  Bladder tacked up/adhesed to the KAYLEY  Left tube and ovary grossly normal. Right ovary and fimbriated end of mesosalpinx adhered. Otherwise, grossly normal tube and ovary   BS performed w/ LigaSure  Hysterotomy closed in x3 layers (0-0 Vicryl x2 with superficial layer w/ 2-0 Caprosyn)  - SurgiCell powder placed over hysterotomy and right ovary/tube mesosalpinx area of bleeding (addressed w/ 2-0 Vicryl w/ interrupted and figure-of-eight)  Fascia reapproximated w/ 1-0 Vicryl in a running fashion   SubQ reapproximated w/ 2-0 plain gut in a running fashion in x2 layers  - SurgiCell powder placed in subQ  Skin reapproximated w/ 3-0 Monocryl  in a subcuticular fashion     1250/3000/800  - IM Methergine given for atony as well as rectal Cyto  - [place] Misoprostol given at conclusion of case   - Plan for STAT CBC and x4hr CBC  - Service attending, Dr. RITA Thomas made aware during case given QBL >1000    Dictation #: MTCS + BS after presenting w/ after PROM @ 38w3d    Viable male infant. Cephalic presentation. 2910g . APGARS 9/9  Omental adhesions to the anterior uterus and peritoneum  Bladder tacked up/adhesed to the KAYLEY  Left tube and ovary grossly normal. Right ovary and fimbriated end of mesosalpinx adhered. Otherwise, grossly normal tube and ovary   BS performed w/ LigaSure  Hysterotomy closed in x3 layers (0-0 Vicryl x2 with superficial layer w/ 2-0 Caprosyn)  - SurgiCell powder placed over hysterotomy and right ovary/tube mesosalpinx area of bleeding (addressed w/ 2-0 Vicryl w/ interrupted and figure-of-eight)  Fascia reapproximated w/ 1-0 Vicryl in a running fashion   SubQ reapproximated w/ 2-0 plain gut in a running fashion in x2 layers  - SurgiCell powder placed in subQ  Skin reapproximated w/ 3-0 Monocryl  in a subcuticular fashion     1250/3000/800  - IM Methergine given for atony as well as rectal Cyto  - [place] Misoprostol given at conclusion of case   - Plan for STAT CBC and x4hr CBC  - Service attending, Dr. RITA Thomas made aware during case given QBL >1000  - Patient informed regarding significant bladder adhesions during the case     Dictation #: [id number] MTCS + BS after presenting w/ after PROM @ 38w3d    Viable male infant. Cephalic presentation. 2910g . APGARS 9/9  Omental adhesions to the anterior uterus and peritoneum  Bladder tacked up/adhesed to the KAYLEY  Left tube and ovary grossly normal. Right ovary and fimbriated end of mesosalpinx adhered. Otherwise, grossly normal tube and ovary   BS performed w/ LigaSure  Hysterotomy closed in x2 layers (0-0 Vicryl x2 with non hemostatic areas were reinforced w/ 2-0 Caprosyn in a figure of eight fashion  - SurgiCell powder placed over hysterotomy and right ovary/tube mesosalpinx area of bleeding (addressed w/ 2-0 Vicryl w/ interrupted and figure-of-eight)  Fascia reapproximated w/ 1-0 Vicryl in a running fashion   SubQ reapproximated w/ 2-0 plain gut in a running fashion in x2 layers  - SurgiCell powder placed in subQ  Skin reapproximated w/ 3-0 Monocryl  in a subcuticular fashion     1250/3000/800  - IM Methergine given for atony as well as rectal Cyto  - [place] Misoprostol given at conclusion of case   - Plan for STAT CBC and x4hr CBC  - Service attending, Dr. RITA Thomas made aware during case given QBL >1000  - Patient informed regarding significant bladder adhesions during the case     Dictation #: [id number]

## 2024-03-17 NOTE — OB RN PATIENT PROFILE - VDRL/RPR: DATE, OB PROFILE
Patient instructed they need to quarantine after COVID test until surgery. Pt informed they are allowed ONE person to come in with them or they can be dropped off and picked up after the procedure but they do need someone with them from the time they leave her until the following morning. Pt instructed to take a shower or bath the night before or morning of procedure and no products on their skin- no lotions, powders, make up, perfume, toenail polish, hair spray or hair gel. Instructed to leave all jewelry and valuables at home and to wear in clean, comfortable, loose clothes-something that will be easy to get in and out of. Instructed to bring a picture ID and insurance card in case we need a copy of that. Pt given their surgery and arrival times, when to quit eating and drinking and then instructed to come in the main entrance, they will have their temperature checked and then take the elevators to the left up to the 3rd floor and check in with the  in the back of the room and have a seat. Pt instructed to not urinate right before coming in because they will need a urine sample for a pregnancy test on admit.
17-Mar-2024

## 2024-03-17 NOTE — OB RN DELIVERY SUMMARY - NSSELHIDDEN_OBGYN_ALL_OB_FT
[NS_DeliveryAttending1_OBGYN_ALL_OB_FT:[id number]],[NS_DeliveryAssist1_OBGYN_ALL_OB_FT:[id number]],[NS_DeliveryRN_OBGYN_ALL_OB_FT:[id number]]

## 2024-03-17 NOTE — ED ADULT TRIAGE NOTE - CHIEF COMPLAINT QUOTE
38 weeks pregnant, MD Aleman, DD3/28. c./o water breaking 40 min ago and pelvic pressure. no hx  sent to L&D with ED tech

## 2024-03-17 NOTE — OB PROVIDER DELIVERY SUMMARY - NSSELHIDDEN_OBGYN_ALL_OB_FT
[NS_DeliveryAttending1_OBGYN_ALL_OB_FT:[id number]],[NS_DeliveryAssist1_OBGYN_ALL_OB_FT:[id number]],[NS_DeliveryRN_OBGYN_ALL_OB_FT:[id number]]

## 2024-03-17 NOTE — OB RN INTRAOPERATIVE NOTE - NSSELHIDDEN_OBGYN_ALL_OB_FT
[NS_DeliveryAttending1_OBGYN_ALL_OB_FT:[id number]],[NS_DeliveryAssist1_OBGYN_ALL_OB_FT:[id number]],[NS_DeliveryRN_OBGYN_ALL_OB_FT:[id number]]

## 2024-03-17 NOTE — OB PROVIDER H&P - ASSESSMENT
Evidence of rupture of membranes, discussed findings with Dr. Davalos   -Admit to L&D  -Routine labs ordered   - huddle held with Dr. Betancur, Dr. Whitt-Gtz, anesthesia, and charge RN   -Two units PRBCs ordered  Evidence of rupture of membranes, discussed findings with Dr. Davalos   -Admit to L&D  -Routine labs ordered   - huddle held with Dr. Betancur, Dr. Whitt-Gtz, anesthesia, and charge RN   -Two units PRBCs ordered       Agree with the above findings. 34yo  @ 38+wga presents for SROM at 5am. clear. Reactive NST. Occasional ctxs. Hx of RCS x2. no complications during pregnancy. NPO at 11pm. Desires sterilization. Counseled pt on risk and benefits. Pt voiced understanding and agrees with plan. Plan RCS and BS.   Chey Davalos MD MPH

## 2024-03-17 NOTE — OB RN PATIENT PROFILE - FALL HARM RISK - RISK INTERVENTIONS

## 2024-03-17 NOTE — OB PROVIDER H&P - NSHPPHYSICALEXAM_GEN_ALL_CORE
ICU Vital Signs Last 24 Hrs  T(C): 37.2 (17 Mar 2024 06:07), Max: 37.2 (17 Mar 2024 06:07)  T(F): 99 (17 Mar 2024 06:07), Max: 99 (17 Mar 2024 06:07)  HR: 60 (17 Mar 2024 06:07) (57 - 60)  BP: 105/62 (17 Mar 2024 06:07) (105/62 - 118/72)  BP(mean): --  ABP: --  ABP(mean): --  RR: 16 (17 Mar 2024 06:07) (16 - 18)  SpO2: 100% (17 Mar 2024 05:44) (100% - 100%)    Abdomen soft nontender  SVE: 0/0/-3, grossly ruptured of clear fluid   TAS: Cephalic presentation, posterior placenta  EFW: 3200gms by leopolds  Category I/Reactive NST  Festus irregularly

## 2024-03-17 NOTE — OB PROVIDER H&P - HISTORY OF PRESENT ILLNESS
Pt. is a 32y/o  EGA 38.3wks reports of leakage of fluid around 5a. Pt. reports mild abdominal contractions. Pt. denies vaginal bleeding. Pt. reports good fetal movement. Pt. states she does not have a scheduled  date as of yet.     NPO since  ([place])     AP: Anemia - Iron transfusions (Last infusion was one month ago)    PNC: Dr. Bhatt

## 2024-03-18 LAB
BASOPHILS # BLD AUTO: 0.02 K/UL — SIGNIFICANT CHANGE UP (ref 0–0.2)
BASOPHILS NFR BLD AUTO: 0.1 % — SIGNIFICANT CHANGE UP (ref 0–2)
EOSINOPHIL # BLD AUTO: 0.04 K/UL — SIGNIFICANT CHANGE UP (ref 0–0.5)
EOSINOPHIL NFR BLD AUTO: 0.3 % — SIGNIFICANT CHANGE UP (ref 0–6)
HCT VFR BLD CALC: 28.7 % — LOW (ref 34.5–45)
HCT VFR BLD CALC: 31.4 % — LOW (ref 34.5–45)
HGB BLD-MCNC: 11 G/DL — LOW (ref 11.5–15.5)
HGB BLD-MCNC: 9.9 G/DL — LOW (ref 11.5–15.5)
IANC: 10.94 K/UL — HIGH (ref 1.8–7.4)
IMM GRANULOCYTES NFR BLD AUTO: 0.4 % — SIGNIFICANT CHANGE UP (ref 0–0.9)
LACTATE SERPL-SCNC: 2 MMOL/L — SIGNIFICANT CHANGE UP (ref 0.5–2)
LYMPHOCYTES # BLD AUTO: 1.86 K/UL — SIGNIFICANT CHANGE UP (ref 1–3.3)
LYMPHOCYTES # BLD AUTO: 13.6 % — SIGNIFICANT CHANGE UP (ref 13–44)
MCHC RBC-ENTMCNC: 28.1 PG — SIGNIFICANT CHANGE UP (ref 27–34)
MCHC RBC-ENTMCNC: 28.7 PG — SIGNIFICANT CHANGE UP (ref 27–34)
MCHC RBC-ENTMCNC: 34.5 GM/DL — SIGNIFICANT CHANGE UP (ref 32–36)
MCHC RBC-ENTMCNC: 35 GM/DL — SIGNIFICANT CHANGE UP (ref 32–36)
MCV RBC AUTO: 81.5 FL — SIGNIFICANT CHANGE UP (ref 80–100)
MCV RBC AUTO: 82 FL — SIGNIFICANT CHANGE UP (ref 80–100)
MONOCYTES # BLD AUTO: 0.78 K/UL — SIGNIFICANT CHANGE UP (ref 0–0.9)
MONOCYTES NFR BLD AUTO: 5.7 % — SIGNIFICANT CHANGE UP (ref 2–14)
NEUTROPHILS # BLD AUTO: 10.94 K/UL — HIGH (ref 1.8–7.4)
NEUTROPHILS NFR BLD AUTO: 79.9 % — HIGH (ref 43–77)
NRBC # BLD: 0 /100 WBCS — SIGNIFICANT CHANGE UP (ref 0–0)
NRBC # BLD: 0 /100 WBCS — SIGNIFICANT CHANGE UP (ref 0–0)
NRBC # FLD: 0 K/UL — SIGNIFICANT CHANGE UP (ref 0–0)
NRBC # FLD: 0 K/UL — SIGNIFICANT CHANGE UP (ref 0–0)
PLATELET # BLD AUTO: 153 K/UL — SIGNIFICANT CHANGE UP (ref 150–400)
PLATELET # BLD AUTO: 153 K/UL — SIGNIFICANT CHANGE UP (ref 150–400)
RBC # BLD: 3.52 M/UL — LOW (ref 3.8–5.2)
RBC # BLD: 3.83 M/UL — SIGNIFICANT CHANGE UP (ref 3.8–5.2)
RBC # FLD: 21.9 % — HIGH (ref 10.3–14.5)
RBC # FLD: SIGNIFICANT CHANGE UP (ref 10.3–14.5)
WBC # BLD: 12.54 K/UL — HIGH (ref 3.8–10.5)
WBC # BLD: 13.7 K/UL — HIGH (ref 3.8–10.5)
WBC # FLD AUTO: 12.54 K/UL — HIGH (ref 3.8–10.5)
WBC # FLD AUTO: 13.7 K/UL — HIGH (ref 3.8–10.5)

## 2024-03-18 PROCEDURE — 74178 CT ABD&PLV WO CNTR FLWD CNTR: CPT | Mod: 26

## 2024-03-18 RX ORDER — SENNA PLUS 8.6 MG/1
2 TABLET ORAL ONCE
Refills: 0 | Status: COMPLETED | OUTPATIENT
Start: 2024-03-18 | End: 2024-03-19

## 2024-03-18 RX ORDER — KETOROLAC TROMETHAMINE 30 MG/ML
15 SYRINGE (ML) INJECTION EVERY 6 HOURS
Refills: 0 | Status: DISCONTINUED | OUTPATIENT
Start: 2024-03-18 | End: 2024-03-19

## 2024-03-18 RX ORDER — DIPHENHYDRAMINE HCL 50 MG
25 CAPSULE ORAL ONCE
Refills: 0 | Status: COMPLETED | OUTPATIENT
Start: 2024-03-18 | End: 2024-03-18

## 2024-03-18 RX ORDER — ACETAMINOPHEN 500 MG
1000 TABLET ORAL EVERY 8 HOURS
Refills: 0 | Status: DISCONTINUED | OUTPATIENT
Start: 2024-03-18 | End: 2024-03-19

## 2024-03-18 RX ADMIN — HEPARIN SODIUM 5000 UNIT(S): 5000 INJECTION INTRAVENOUS; SUBCUTANEOUS at 18:31

## 2024-03-18 RX ADMIN — SIMETHICONE 80 MILLIGRAM(S): 80 TABLET, CHEWABLE ORAL at 10:53

## 2024-03-18 RX ADMIN — Medication 15 MILLIGRAM(S): at 18:33

## 2024-03-18 RX ADMIN — Medication 975 MILLIGRAM(S): at 11:58

## 2024-03-18 RX ADMIN — MAGNESIUM HYDROXIDE 30 MILLILITER(S): 400 TABLET, CHEWABLE ORAL at 15:07

## 2024-03-18 RX ADMIN — Medication 975 MILLIGRAM(S): at 05:23

## 2024-03-18 RX ADMIN — OXYCODONE HYDROCHLORIDE 5 MILLIGRAM(S): 5 TABLET ORAL at 14:47

## 2024-03-18 RX ADMIN — MAGNESIUM HYDROXIDE 30 MILLILITER(S): 400 TABLET, CHEWABLE ORAL at 01:18

## 2024-03-18 RX ADMIN — Medication 400 MILLIGRAM(S): at 21:11

## 2024-03-18 RX ADMIN — Medication 975 MILLIGRAM(S): at 05:53

## 2024-03-18 RX ADMIN — HEPARIN SODIUM 5000 UNIT(S): 5000 INJECTION INTRAVENOUS; SUBCUTANEOUS at 05:23

## 2024-03-18 RX ADMIN — Medication 15 MILLIGRAM(S): at 19:05

## 2024-03-18 RX ADMIN — Medication 975 MILLIGRAM(S): at 12:35

## 2024-03-18 RX ADMIN — OXYCODONE HYDROCHLORIDE 5 MILLIGRAM(S): 5 TABLET ORAL at 09:48

## 2024-03-18 RX ADMIN — Medication 25 MILLIGRAM(S): at 11:59

## 2024-03-18 RX ADMIN — SIMETHICONE 80 MILLIGRAM(S): 80 TABLET, CHEWABLE ORAL at 15:07

## 2024-03-18 RX ADMIN — OXYCODONE HYDROCHLORIDE 5 MILLIGRAM(S): 5 TABLET ORAL at 15:30

## 2024-03-18 RX ADMIN — SIMETHICONE 80 MILLIGRAM(S): 80 TABLET, CHEWABLE ORAL at 05:20

## 2024-03-18 RX ADMIN — OXYCODONE HYDROCHLORIDE 5 MILLIGRAM(S): 5 TABLET ORAL at 10:20

## 2024-03-18 RX ADMIN — Medication 1000 MILLIGRAM(S): at 22:00

## 2024-03-18 NOTE — PROVIDER CONTACT NOTE (OTHER) - BACKGROUND
Rpt C/S 3/17 @0956. QBL 1230 s/p 1 unit PRBC, extra pitocin, IM Methergine. Pt has Hx of Anemia, gastric sleeve.

## 2024-03-18 NOTE — PROVIDER CONTACT NOTE (OTHER) - SITUATION
Pt failed orthos, upon standing complained of chest pain, shortness of breath, feeling weak.
pt in pacu presenting with temp of 35.6 and hypotensive  rn to start blood transfusion and also draw lactate cbc and cmp  rn to reassess status
pt is in or 1 for repeat c/s   current qbl is 1250  chief resident hilario brown and charge rn made aware  pt received methergine and extra pit
patient is 3 hour in pacu   her urine output is 30ml when adequate is 40ml  joyce brown notified and said no neeed to do any interventions just continue to watch

## 2024-03-18 NOTE — PROVIDER CONTACT NOTE (OTHER) - ASSESSMENT
BPs/Orthos as charted in flowsheet, RR 20, spo2 at 99%. Pt tolerated standing and ambulating to the bathroom and voided. Incentive spirometer use encouraged, pt tolerated 10 repetitions at level 1500.

## 2024-03-18 NOTE — PROGRESS NOTE ADULT - SUBJECTIVE AND OBJECTIVE BOX
OB Progress Note:  Delivery, POD#1    S: 32yo POD#1 s/p rLTCS+BS complicated by PPH secondary to atony for which she received IM methergine, extra pitocin, cytotec [place] and 1u PRBC in the PACU.  She complains of pain at her lower abdomen, rated 10/10. Adds that she has not yet been able to pass gas and attributes her pain to gas pain. Reports mild lightheadedness when ambulating. Denies lightheadedness at rest.  Denies N/V. Denies CP/SOB/palpitations.  Voiding spontaneously.     O:   Vital Signs Last 24 Hrs  T(C): 37.2 (18 Mar 2024 10:34), Max: 37.2 (18 Mar 2024 10:34)  T(F): 98.9 (18 Mar 2024 10:34), Max: 98.9 (18 Mar 2024 10:34)  HR: 90 (18 Mar 2024 10:34) (72 - 108)  BP: 93/56 (18 Mar 2024 10:34) (89/56 - 102/54)  BP(mean): 65 (17 Mar 2024 16:30) (59 - 73)  RR: 18 (18 Mar 2024 10:34) (18 - 27)  SpO2: 98% (18 Mar 2024 10:34) (98% - 100%)    Parameters below as of 18 Mar 2024 05:28  Patient On (Oxygen Delivery Method): room air        MEDICATIONS  (STANDING):  acetaminophen     Tablet .. 975 milliGRAM(s) Oral <User Schedule>  diphtheria/tetanus/pertussis (acellular) Vaccine (Adacel) 0.5 milliLiter(s) IntraMuscular once  heparin   Injectable 5000 Unit(s) SubCutaneous every 12 hours  ibuprofen  Tablet. 600 milliGRAM(s) Oral every 6 hours  influenza   Vaccine 0.5 milliLiter(s) IntraMuscular once  ketorolac   Injectable 30 milliGRAM(s) IV Push every 6 hours  oxytocin Infusion 333.333 milliUNIT(s)/Min (1000 mL/Hr) IV Continuous <Continuous>      MEDICATIONS  (PRN):  dexAMETHasone  Injectable 4 milliGRAM(s) IV Push every 6 hours PRN Nausea  diphenhydrAMINE 25 milliGRAM(s) Oral every 6 hours PRN Pruritus  lanolin Ointment 1 Application(s) Topical every 6 hours PRN Sore Nipples  magnesium hydroxide Suspension 30 milliLiter(s) Oral two times a day PRN Constipation  nalbuphine Injectable 2.5 milliGRAM(s) IV Push every 6 hours PRN Pruritus  naloxone Injectable 0.1 milliGRAM(s) IV Push every 3 minutes PRN For ANY of the following changes in patient status:  A. Breaths Per Minute LESS THAN 10, B. Oxygen saturation LESS THAN 90%, C. Sedation score of 6 for Stop After: 4 Times  ondansetron Injectable 4 milliGRAM(s) IV Push every 6 hours PRN Nausea  oxyCODONE    IR 5 milliGRAM(s) Oral once PRN Moderate to Severe Pain (4-10)  oxyCODONE    IR 5 milliGRAM(s) Oral every 3 hours PRN Moderate to Severe Pain (4-10)  simethicone 80 milliGRAM(s) Chew every 4 hours PRN Gas        Labs:  Blood type: AB Positive  Rubella IgG: RPR: Negative                          9.9<L>   13.70<H> >-----------< 153    (  @ 06:38 )             28.7<L>                        12.7   17.75<H> >-----------< 172    (  @ 16:05 )             38.4                        11.6   17.20<H> >-----------< 185    (  @ 12:36 )             35.7                        11.9   18.40<H> >-----------< 182    (  @ 12:00 )             36.1                        13.4   9.32 >-----------< 194    (  @ 06:35 )             41.1                  PE:  General: NAD  Abdomen: Soft. Mildly distended, mildly tympanitic. 10/10 sharp abdominal tenderness in the lower abdominal quadrants.  Incision: overlying steri strips; minimal dark brown oozing from central Pfannenstiel Otherwise intact.  : Lochia wnl  Extremities: No erythema, no pitting edema. No calf tenderness.

## 2024-03-18 NOTE — PROVIDER CONTACT NOTE (OTHER) - NAME OF MD/NP/PA/DO NOTIFIED:
GERMAN Marmolejo.
joyce brown
yoselyn brown, christal brown, mateo brown, danuta brown, jacinto brown
yoselyn brown

## 2024-03-18 NOTE — PROGRESS NOTE ADULT - ASSESSMENT
34yo POD#1 s/p rLTCS+BS complicated by PPH secondary to atony for which she received IM methergine, extra pitocin, cytotec [place] and 1u PRBC in the PACU. Patient symptomatic for anemia, hypotension, and with abdominal tenderness concerning for intraperitoneal bleed.    #Postop from LTCS  - Continue regular diet.  - Increase ambulation as tolerated.  - Continue motrin, tylenol, oxycodone PRN for pain control.    #Postpartum Hemorrhage  - Secondary to atony for which she received IM methergine, extra pitocin, cytotec [place]  - S/p 1u PRBC in the PACU (3/17)  - QBL 1230  - Hct: 41.1->1upRBC->36.1->35.7->38.4->28.7, large drop in H/H this morning   - Lactate: 2.6->2.0  - Patient hypotensive with BPs 90s/50s, HR in 90s.  - Symptomatic for anemia (lightheadedness upon ambulation)  - In the setting of moderate abdominal tenderness, hypotension, and large drop in H/H, decision made to obtain CTAP w/ w/o IV contrast and to transfuse 1u PRBC  - Repeat orthos after 1u PRBCs  - Will F/u CTAP and will continue to monitor patient for signs/symptoms of anemia.    Leslie Diggs MD PGY1

## 2024-03-18 NOTE — PROVIDER CONTACT NOTE (OTHER) - REASON
pph in OR 1
low urine output
patient status in pacu
Pt complaining of sob, chest pain, dizziness, light headed and weak, failed orthos.

## 2024-03-18 NOTE — PROVIDER CONTACT NOTE (OTHER) - ACTION/TREATMENT ORDERED:
will continue to monitor bleeding and do cbc in pacu asap and a repeat in 4 hours
1 L bolus of LR and ekg ordered, rpt orthos.

## 2024-03-18 NOTE — PROGRESS NOTE ADULT - ATTENDING COMMENTS
Associate Chief of L & D ( late entry)    I have met this patient for the first time today.  She gets her care at [facility] and cared for by DR Brito.  She was admitted and delivered by DR Davalos    OB Progress Note:  Delivery, POD#1    S: 32yo POD#1 s/p MTCS & BS . Patient was symptomatioc when getting out of bed and had acute blood loss anemia at the time of c section     O:   Vital Signs Last 24 Hrs  T(C): 36.9 (18 Mar 2024 12:17), Max: 37.2 (18 Mar 2024 10:34)  T(F): 98.5 (18 Mar 2024 12:17), Max: 98.9 (18 Mar 2024 10:34)  HR: 90 (18 Mar 2024 12:17) (77 - 107)  BP: 101/56 (18 Mar 2024 12:17) (89/56 - 102/54)  BP(mean): 65 (17 Mar 2024 16:30) (64 - 73)  RR: 18 (18 Mar 2024 12:17) (18 - 27)  SpO2: 99% (18 Mar 2024 12:17) (98% - 100%)    Parameters below as of 18 Mar 2024 12:17  Patient On (Oxygen Delivery Method): room air        Labs:  Blood type: AB Positive  Rubella IgG: RPR: Negative                          9.9<L>   13.70<H> >-----------< 153    (  @ 06:38 )             28.7<L>                        12.7   17.75<H> >-----------< 172    (  @ 16:05 )             38.4                        11.6   17.20<H> >-----------< 185    (  @ 12:36 )             35.7                        11.9   18.40<H> >-----------< 182    (  @ 12:00 )             36.1                        13.4   9.32 >-----------< 194    (  @ 06:35 )             41.1            PE:    Abdomen: Mildly distended, appropriately tender  incision c/d/i.  Extremities: No erythema, no pitting edema    A/P: 32yo POD#1 s/p MTCS & BS.  with acute blood loss anemia- PPH due to MTCS and atony s/p 1 unit PRBC, with significant drop in H/H and symptomatic  - Continue regular diet.  - Increase ambulations  - CBC  - Pain management  - CT to evaluate for intraperitoneal bleed  - this was discussed in detailed with the patient and the residents   - transfuse another unit PRBC  - also called and made DR brito aware    Lindsey Canales M.D., M.B.A., M.S.

## 2024-03-19 LAB
HCT VFR BLD CALC: 30.1 % — LOW (ref 34.5–45)
HGB BLD-MCNC: 10 G/DL — LOW (ref 11.5–15.5)
MCHC RBC-ENTMCNC: 27.9 PG — SIGNIFICANT CHANGE UP (ref 27–34)
MCHC RBC-ENTMCNC: 33.2 GM/DL — SIGNIFICANT CHANGE UP (ref 32–36)
MCV RBC AUTO: 83.8 FL — SIGNIFICANT CHANGE UP (ref 80–100)
NRBC # BLD: 0 /100 WBCS — SIGNIFICANT CHANGE UP (ref 0–0)
NRBC # FLD: 0 K/UL — SIGNIFICANT CHANGE UP (ref 0–0)
PLATELET # BLD AUTO: 147 K/UL — LOW (ref 150–400)
RBC # BLD: 3.59 M/UL — LOW (ref 3.8–5.2)
RBC # FLD: 22.5 % — HIGH (ref 10.3–14.5)
WBC # BLD: 11.34 K/UL — HIGH (ref 3.8–10.5)
WBC # FLD AUTO: 11.34 K/UL — HIGH (ref 3.8–10.5)

## 2024-03-19 RX ORDER — IBUPROFEN 200 MG
600 TABLET ORAL EVERY 6 HOURS
Refills: 0 | Status: DISCONTINUED | OUTPATIENT
Start: 2024-03-19 | End: 2024-03-20

## 2024-03-19 RX ORDER — ACETAMINOPHEN 500 MG
975 TABLET ORAL
Refills: 0 | Status: DISCONTINUED | OUTPATIENT
Start: 2024-03-19 | End: 2024-03-20

## 2024-03-19 RX ORDER — IBUPROFEN 200 MG
600 TABLET ORAL EVERY 6 HOURS
Refills: 0 | Status: COMPLETED | OUTPATIENT
Start: 2024-03-19 | End: 2025-02-15

## 2024-03-19 RX ADMIN — Medication 15 MILLIGRAM(S): at 00:55

## 2024-03-19 RX ADMIN — HEPARIN SODIUM 5000 UNIT(S): 5000 INJECTION INTRAVENOUS; SUBCUTANEOUS at 17:09

## 2024-03-19 RX ADMIN — Medication 400 MILLIGRAM(S): at 05:20

## 2024-03-19 RX ADMIN — Medication 600 MILLIGRAM(S): at 08:45

## 2024-03-19 RX ADMIN — Medication 600 MILLIGRAM(S): at 17:09

## 2024-03-19 RX ADMIN — Medication 600 MILLIGRAM(S): at 09:21

## 2024-03-19 RX ADMIN — Medication 975 MILLIGRAM(S): at 12:45

## 2024-03-19 RX ADMIN — Medication 600 MILLIGRAM(S): at 17:55

## 2024-03-19 RX ADMIN — HEPARIN SODIUM 5000 UNIT(S): 5000 INJECTION INTRAVENOUS; SUBCUTANEOUS at 05:58

## 2024-03-19 RX ADMIN — SENNA PLUS 2 TABLET(S): 8.6 TABLET ORAL at 12:00

## 2024-03-19 RX ADMIN — MAGNESIUM HYDROXIDE 30 MILLILITER(S): 400 TABLET, CHEWABLE ORAL at 08:46

## 2024-03-19 RX ADMIN — Medication 975 MILLIGRAM(S): at 12:00

## 2024-03-19 RX ADMIN — Medication 975 MILLIGRAM(S): at 21:35

## 2024-03-19 RX ADMIN — SIMETHICONE 80 MILLIGRAM(S): 80 TABLET, CHEWABLE ORAL at 05:55

## 2024-03-19 RX ADMIN — Medication 1000 MILLIGRAM(S): at 05:50

## 2024-03-19 RX ADMIN — Medication 975 MILLIGRAM(S): at 20:46

## 2024-03-19 RX ADMIN — Medication 15 MILLIGRAM(S): at 00:22

## 2024-03-19 NOTE — PROGRESS NOTE ADULT - ASSESSMENT
34yo POD#2 s/p rMTCS+BS complicated by PPH secondary to atony for which she received IM methergine, extra pitocin, cytotec [place], 1u PRBC in the PACU, and 1u PRBC yesterday for symptomatic anemia. Patient no longer endorsing symptoms of anemia and pain is improving at this time.    #Postop from MTCS  - Continue regular diet.  - Increase ambulation as tolerated.  - Transition to PO pain medications today. Motrin, tylenol, oxycodone PRN for pain control.  - Senna, Simethicone, and Milk of Magnesia PRN for constipation.    #Postpartum Hemorrhage  - Secondary to atony for which she received IM methergine, extra pitocin, cytotec [place]  - S/p 1u PRBC in the PACU (3/17)  - QBL 1230  - Hct: 41.1->1upRBC->36.1->35.7->38.4->28.7->1u PRBC->31.4, appropriate rise in H/h after 1u  - Lactate: 2.6->2.0  - CTAP (3/18) yesterday to rule out intraperitoneal bleed/collection revealed hyperdense material in EM, likely blood products 3.2x2.1cm at [facility]. EMS 1.9cm. Trace ascites. No hematomas or active bleeding.  - F/u with AM CBC to trend Hct  - Patient no long endorsing anemia symptoms.    Leslie Diggs MD PGY1

## 2024-03-19 NOTE — PROGRESS NOTE ADULT - SUBJECTIVE AND OBJECTIVE BOX
OB Progress Note: MTCS, POD#2    S: 32yo POD#2 s/p rMTCS. Pain is moderately controlled. She reports improvement from yesterday. Now notes 6/10 crampy pain occasionally.  She is tolerating a regular diet. Denies passing flatus. She is voiding spontaneously, and ambulating without difficulty. Denies CP/SOB. Denies lightheadedness/dizziness. Denies N/V.    O:  Vitals:  Vital Signs Last 24 Hrs  T(C): 36.6 (19 Mar 2024 06:00), Max: 37.2 (18 Mar 2024 10:34)  T(F): 97.8 (19 Mar 2024 06:00), Max: 98.9 (18 Mar 2024 10:34)  HR: 83 (19 Mar 2024 06:00) (83 - 102)  BP: 101/56 (19 Mar 2024 06:00) (80/40 - 102/62)  BP(mean): --  RR: 18 (19 Mar 2024 06:00) (18 - 18)  SpO2: 100% (19 Mar 2024 06:00) (98% - 100%)    Parameters below as of 18 Mar 2024 18:40  Patient On (Oxygen Delivery Method): room air        MEDICATIONS  (STANDING):  acetaminophen   IVPB .. 1000 milliGRAM(s) IV Intermittent every 8 hours  diphtheria/tetanus/pertussis (acellular) Vaccine (Adacel) 0.5 milliLiter(s) IntraMuscular once  heparin   Injectable 5000 Unit(s) SubCutaneous every 12 hours  ibuprofen  Tablet. 600 milliGRAM(s) Oral every 6 hours  influenza   Vaccine 0.5 milliLiter(s) IntraMuscular once  ketorolac   Injectable 15 milliGRAM(s) IV Push every 6 hours      MEDICATIONS  (PRN):  diphenhydrAMINE 25 milliGRAM(s) Oral every 6 hours PRN Pruritus  lanolin Ointment 1 Application(s) Topical every 6 hours PRN Sore Nipples  magnesium hydroxide Suspension 30 milliLiter(s) Oral two times a day PRN Constipation  oxyCODONE    IR 5 milliGRAM(s) Oral once PRN Moderate to Severe Pain (4-10)  oxyCODONE    IR 5 milliGRAM(s) Oral every 3 hours PRN Moderate to Severe Pain (4-10)  senna 2 Tablet(s) Oral once PRN Constipation  simethicone 80 milliGRAM(s) Chew every 4 hours PRN Gas      Labs:  Blood type: AB Positive  Rubella IgG: RPR: Negative                          11.0<L>   12.54<H> >-----------< 153    ( 03-18 @ 18:00 )             31.4<L>                        9.9<L>   13.70<H> >-----------< 153    ( 03-18 @ 06:38 )             28.7<L>                        12.7   17.75<H> >-----------< 172    ( 03-17 @ 16:05 )             38.4                        11.6   17.20<H> >-----------< 185    ( 03-17 @ 12:36 )             35.7                        11.9   18.40<H> >-----------< 182    ( 03-17 @ 12:00 )             36.1                        13.4   9.32 >-----------< 194    ( 03-17 @ 06:35 )             41.1                  PE:  General: NAD  Abdomen: Soft, appropriately tender, incision c/d/i.  : Lochia wnl  Extremities: No erythema, no pitting edema   OB Progress Note: MTCS, POD#2    S: 34yo POD#2 s/p rMTCS. Pain is moderately controlled. She reports improvement from yesterday. Now notes 6/10 crampy pain occasionally.  She is tolerating a regular diet. Denies passing flatus. She is voiding spontaneously, and ambulating without difficulty. Denies CP/SOB. Denies lightheadedness/dizziness. Denies N/V.    O:  Vitals:  Vital Signs Last 24 Hrs  T(C): 36.6 (19 Mar 2024 06:00), Max: 37.2 (18 Mar 2024 10:34)  T(F): 97.8 (19 Mar 2024 06:00), Max: 98.9 (18 Mar 2024 10:34)  HR: 83 (19 Mar 2024 06:00) (83 - 102)  BP: 101/56 (19 Mar 2024 06:00) (80/40 - 102/62)  BP(mean): --  RR: 18 (19 Mar 2024 06:00) (18 - 18)  SpO2: 100% (19 Mar 2024 06:00) (98% - 100%)    Parameters below as of 18 Mar 2024 18:40  Patient On (Oxygen Delivery Method): room air        MEDICATIONS  (STANDING):  acetaminophen   IVPB .. 1000 milliGRAM(s) IV Intermittent every 8 hours  diphtheria/tetanus/pertussis (acellular) Vaccine (Adacel) 0.5 milliLiter(s) IntraMuscular once  heparin   Injectable 5000 Unit(s) SubCutaneous every 12 hours  ibuprofen  Tablet. 600 milliGRAM(s) Oral every 6 hours  influenza   Vaccine 0.5 milliLiter(s) IntraMuscular once  ketorolac   Injectable 15 milliGRAM(s) IV Push every 6 hours      MEDICATIONS  (PRN):  diphenhydrAMINE 25 milliGRAM(s) Oral every 6 hours PRN Pruritus  lanolin Ointment 1 Application(s) Topical every 6 hours PRN Sore Nipples  magnesium hydroxide Suspension 30 milliLiter(s) Oral two times a day PRN Constipation  oxyCODONE    IR 5 milliGRAM(s) Oral once PRN Moderate to Severe Pain (4-10)  oxyCODONE    IR 5 milliGRAM(s) Oral every 3 hours PRN Moderate to Severe Pain (4-10)  senna 2 Tablet(s) Oral once PRN Constipation  simethicone 80 milliGRAM(s) Chew every 4 hours PRN Gas      Labs:  Blood type: AB Positive  Rubella IgG: RPR: Negative                          11.0<L>   12.54<H> >-----------< 153    ( 03-18 @ 18:00 )             31.4<L>                        9.9<L>   13.70<H> >-----------< 153    ( 03-18 @ 06:38 )             28.7<L>                        12.7   17.75<H> >-----------< 172    ( 03-17 @ 16:05 )             38.4                        11.6   17.20<H> >-----------< 185    ( 03-17 @ 12:36 )             35.7                        11.9   18.40<H> >-----------< 182    ( 03-17 @ 12:00 )             36.1                        13.4   9.32 >-----------< 194    ( 03-17 @ 06:35 )             41.1                  PE:  General: NAD  Abdomen: Soft, tenderness to palpation of uterus described as pressure like and rated 6/10, incision c/d/i.  : Lochia wnl  Extremities: No erythema, no pitting edema

## 2024-03-20 ENCOUNTER — TRANSCRIPTION ENCOUNTER (OUTPATIENT)
Age: 34
End: 2024-03-20

## 2024-03-20 VITALS
SYSTOLIC BLOOD PRESSURE: 112 MMHG | OXYGEN SATURATION: 98 % | DIASTOLIC BLOOD PRESSURE: 72 MMHG | RESPIRATION RATE: 16 BRPM | HEART RATE: 86 BPM | TEMPERATURE: 98 F

## 2024-03-20 RX ORDER — ACETAMINOPHEN 500 MG
2 TABLET ORAL
Qty: 40 | Refills: 0
Start: 2024-03-20 | End: 2024-03-24

## 2024-03-20 RX ORDER — RIBOFLAVIN (VITAMIN B2) 25 MG
1 TABLET ORAL
Refills: 0 | DISCHARGE

## 2024-03-20 RX ORDER — IBUPROFEN 200 MG
1 TABLET ORAL
Qty: 20 | Refills: 0
Start: 2024-03-20 | End: 2024-03-24

## 2024-03-20 RX ORDER — FERROUS SULFATE 325(65) MG
0 TABLET ORAL
Refills: 0 | DISCHARGE

## 2024-03-20 RX ADMIN — Medication 600 MILLIGRAM(S): at 05:33

## 2024-03-20 RX ADMIN — Medication 975 MILLIGRAM(S): at 09:11

## 2024-03-20 RX ADMIN — Medication 975 MILLIGRAM(S): at 09:51

## 2024-03-20 RX ADMIN — Medication 600 MILLIGRAM(S): at 11:50

## 2024-03-20 RX ADMIN — HEPARIN SODIUM 5000 UNIT(S): 5000 INJECTION INTRAVENOUS; SUBCUTANEOUS at 05:33

## 2024-03-20 RX ADMIN — Medication 975 MILLIGRAM(S): at 03:17

## 2024-03-20 RX ADMIN — Medication 600 MILLIGRAM(S): at 06:30

## 2024-03-20 RX ADMIN — Medication 975 MILLIGRAM(S): at 04:00

## 2024-03-20 RX ADMIN — SIMETHICONE 80 MILLIGRAM(S): 80 TABLET, CHEWABLE ORAL at 05:32

## 2024-03-20 RX ADMIN — Medication 600 MILLIGRAM(S): at 12:59

## 2024-03-20 RX ADMIN — Medication 975 MILLIGRAM(S): at 14:42

## 2024-03-20 NOTE — DISCHARGE NOTE OB - PATIENT PORTAL LINK FT
You can access the FollowMyHealth Patient Portal offered by [facility] by registering at the following website: [email or web address]. By joining [employer]’s FollowMyHealth portal, you will also be able to view your health information using other applications (apps) compatible with our system.

## 2024-03-20 NOTE — DISCHARGE NOTE OB - HOSPITAL COURSE
Patient presented to labor and delivery in labor. A live female infant was born via RCS, bilateral salpingectomy, WALDEMAR. Patient received 2units of PRBC's due to acute blood loss. The remainder of hospital course was  uneventful.  The patient was discharged on POD#3  in stable condition.

## 2024-03-20 NOTE — DISCHARGE NOTE OB - CARE PLAN
Principal Discharge DX:	Term birth of female   Assessment and plan of treatment:	Full recovery   Pelvic rest x 6wks  Regular diet   1

## 2024-03-20 NOTE — CHART NOTE - NSCHARTNOTEFT_GEN_A_CORE
Attending Note    Patient evaluated at bedside. Denies any acute events overnight. Tolerating diet, + flatus, lochia light. NO dysuria. Breast and bottle feeding. Minimal discomfort  VS T 36.6  p 80  R 16  /59  O2 sat 100% RA    Heent nl  abd soft fundus firm  incision C/D/I  ext no CCe    A: 32 yo POD#3 s/p RCS, BS, WALDEMAR, s/p 2units of PRBCS clinically stable for discharge  P: DC home today     Discharge instructions reviewed     f/u in 2wks for postop visit     Mbfrancisco
R4 Chart note    Patient seen at bedside for hypotension requiring pressors and low temperature. Patient reports feeling tired and has warm bear-hugger on chest. Denies fevers, headaches, CP, SOB, abdominal pain and edema.     Vital Signs Last 24 Hrs  T(C): 36.6 (17 Mar 2024 06:52), Max: 37.2 (17 Mar 2024 06:07)  T(F): 97.9 (17 Mar 2024 06:52), Max: 99 (17 Mar 2024 06:07)  HR: 60 (17 Mar 2024 06:52) (57 - 60)  BP: 105/62 (17 Mar 2024 06:52) (105/62 - 118/72)  BP(mean): --  RR: 15 (17 Mar 2024 06:52) (15 - 18)  SpO2: 100% (17 Mar 2024 05:44) (100% - 100%)    Gen NAD  CV Regular   Pulm comfortable on RA  Abd soft nontender, fundus firm   minimal vaginal bleeding   BSUS thin endometrial stripe, FAST scan negative   Ext nontender b/l                13.4   9.32  )-----------( 194      ( -17 @ 06:35 )             41.1     A/P  34yo  POD#0 s/p rC/S c/b PPH 2/2 atony with hypotension in PACU on pressors.     - STAT CBC, coags, lactate  - 1u pRBC to start   - anesthesia notified   - continue IV Fluids   - continue to closely monitor VS  - routine postpartum care    d/w Dr. Davalos  seen at bedside with Dr. Betancur and Dr. William Brumfield PGY4
Attending Note    Patient evaluated at bedside. Patient c/o  gas pains. Denies  shortness of breath, chest pain, palpitations, etc. Lochia light. Breast and bottle feeding. No flatus.   VS T 37.2  P 87  R 19  /51  O2 sat 99% RA    Heent nl  abd soft Incision C/D/I, no rigidity or distention  Ext no CCE  Neuro AAo x 3  labs: H/H 10/30  plts 147K  WBC 11    A: 32 yo P3 POD#2 s/p  RCS, WALDEMAR< s/p 2units of PRBC's due to acute intraop blood loss, clinically stable  P: Routine postop care  Encourage ambulation  DC planning POD#3  MBeauvil

## 2024-03-20 NOTE — DISCHARGE NOTE OB - CARE PROVIDER_API CALL
Pascale Bhatt  Obstetrics and Gynecology  [address] [place] [address]  Phone: [phone]  Fax: [phone]  Follow Up Time:

## 2024-03-20 NOTE — DISCHARGE NOTE OB - MEDICATION SUMMARY - MEDICATIONS TO TAKE
I will START or STAY ON the medications listed below when I get home from the hospital:    ibuprofen 600 mg oral tablet  -- 1 tab(s) by mouth 4 times a day  -- Indication: For Premature rupture of membranes    acetaminophen 500 mg oral tablet  -- 2 tab(s) by mouth 4 times a day  -- Indication: For Premature rupture of membranes

## 2024-03-22 ENCOUNTER — APPOINTMENT (OUTPATIENT)
Dept: ANTEPARTUM | Facility: CLINIC | Age: 34
End: 2024-03-22

## 2024-03-25 LAB — SURGICAL PATHOLOGY STUDY: SIGNIFICANT CHANGE UP

## 2025-04-01 NOTE — OB PROVIDER H&P - NS_CSECTION_OBGYN_ALL_OB_NU
Patient monitored during conscious sedation for LP. Mask with etCO2 monitoring. 8 LPM O2. Patient maintained good vital signs throughout the procedure.Monitoring and O2 discontinued as patient regained awareness.   2

## (undated) DEVICE — DRSG MASTISOL

## (undated) DEVICE — POSITIONER STRAP ARMBOARD VELCRO TS-30

## (undated) DEVICE — DISSECTOR ENDOSCOPIC KITTNER SINGLE TIP

## (undated) DEVICE — DRSG STERISTRIPS 0.5 X 4"

## (undated) DEVICE — D HELP - CLEARVIEW CLEARIFY SYSTEM

## (undated) DEVICE — SOL INJ NS 0.9% 1000ML

## (undated) DEVICE — SUT MONOCRYL 4-0 27" PS-2 UNDYED

## (undated) DEVICE — WARMING BLANKET UPPER ADULT

## (undated) DEVICE — PROTECTOR HEEL / ELBOW

## (undated) DEVICE — TROCAR COVIDIEN VERSAPORT BLADELESS OPTICAL 5MM STANDARD

## (undated) DEVICE — TUBING INSUFFLATION LAP FILTER 10FT

## (undated) DEVICE — SUT VICRYL 0 27" UR-6

## (undated) DEVICE — BLADE SURGICAL #15 CARBON

## (undated) DEVICE — SOL IRR POUR H2O 500ML

## (undated) DEVICE — TROCAR COVIDIEN VERSAONE BLUNT TIP HASSAN 12MM

## (undated) DEVICE — TROCAR COVIDIEN BLUNT TIP HASSAN 10MM

## (undated) DEVICE — ENDOCATCH 10MM SPECIMEN POUCH

## (undated) DEVICE — TIP METZENBAUM SCISSOR MONOPOLAR ENDOCUT (ORANGE)

## (undated) DEVICE — TROCAR COVIDIEN BLUNT TIP HASSAN 10MM STANDARD

## (undated) DEVICE — GLV 6.5 PROTEXIS W HYDROGEL

## (undated) DEVICE — TUBING OLYMPUS INSUFFLATION

## (undated) DEVICE — DRAPE TOWEL 1010

## (undated) DEVICE — PACK GENERAL LAPAROSCOPY

## (undated) DEVICE — DRAPE 3/4 SHEET 52X76"

## (undated) DEVICE — TUBING STRYKEFLOW II SUCTION / IRRIGATOR

## (undated) DEVICE — ELCTR GROUNDING PAD ADULT COVIDIEN

## (undated) DEVICE — TROCAR COVIDIEN VERSAONE BLADED FIXATION 11MM STANDARD

## (undated) DEVICE — BASIN SET SINGLE

## (undated) DEVICE — VENODYNE/SCD SLEEVE CALF MEDIUM